# Patient Record
Sex: FEMALE | Race: WHITE | NOT HISPANIC OR LATINO | Employment: FULL TIME | URBAN - METROPOLITAN AREA
[De-identification: names, ages, dates, MRNs, and addresses within clinical notes are randomized per-mention and may not be internally consistent; named-entity substitution may affect disease eponyms.]

---

## 2017-01-23 RX ORDER — LOSARTAN POTASSIUM 100 MG/1
TABLET ORAL
Refills: 2 | OUTPATIENT
Start: 2017-01-23

## 2017-01-27 ENCOUNTER — APPOINTMENT (OUTPATIENT)
Dept: MEDICAL GROUP | Facility: PHYSICIAN GROUP | Age: 62
End: 2017-01-27
Payer: COMMERCIAL

## 2017-04-18 ENCOUNTER — HOSPITAL ENCOUNTER (OUTPATIENT)
Dept: LAB | Facility: MEDICAL CENTER | Age: 62
End: 2017-04-18
Attending: INTERNAL MEDICINE
Payer: COMMERCIAL

## 2017-04-18 DIAGNOSIS — Z00.00 ROUTINE GENERAL MEDICAL EXAMINATION AT A HEALTH CARE FACILITY: ICD-10-CM

## 2017-04-18 LAB
ALBUMIN SERPL BCP-MCNC: 4.2 G/DL (ref 3.2–4.9)
ALBUMIN/GLOB SERPL: 1.5 G/DL
ALP SERPL-CCNC: 47 U/L (ref 30–99)
ALT SERPL-CCNC: 15 U/L (ref 2–50)
ANION GAP SERPL CALC-SCNC: 7 MMOL/L (ref 0–11.9)
AST SERPL-CCNC: 16 U/L (ref 12–45)
BASOPHILS # BLD AUTO: 1.1 % (ref 0–1.8)
BASOPHILS # BLD: 0.05 K/UL (ref 0–0.12)
BILIRUB SERPL-MCNC: 0.6 MG/DL (ref 0.1–1.5)
BUN SERPL-MCNC: 22 MG/DL (ref 8–22)
CALCIUM SERPL-MCNC: 9.2 MG/DL (ref 8.5–10.5)
CHLORIDE SERPL-SCNC: 105 MMOL/L (ref 96–112)
CHOLEST SERPL-MCNC: 203 MG/DL (ref 100–199)
CO2 SERPL-SCNC: 26 MMOL/L (ref 20–33)
CREAT SERPL-MCNC: 0.69 MG/DL (ref 0.5–1.4)
EOSINOPHIL # BLD AUTO: 0.09 K/UL (ref 0–0.51)
EOSINOPHIL NFR BLD: 1.9 % (ref 0–6.9)
ERYTHROCYTE [DISTWIDTH] IN BLOOD BY AUTOMATED COUNT: 44.3 FL (ref 35.9–50)
GFR SERPL CREATININE-BSD FRML MDRD: >60 ML/MIN/1.73 M 2
GLOBULIN SER CALC-MCNC: 2.8 G/DL (ref 1.9–3.5)
GLUCOSE SERPL-MCNC: 113 MG/DL (ref 65–99)
HCT VFR BLD AUTO: 39.6 % (ref 37–47)
HDLC SERPL-MCNC: 130 MG/DL
HGB BLD-MCNC: 12.7 G/DL (ref 12–16)
IMM GRANULOCYTES # BLD AUTO: 0 K/UL (ref 0–0.11)
IMM GRANULOCYTES NFR BLD AUTO: 0 % (ref 0–0.9)
LDLC SERPL CALC-MCNC: 61 MG/DL
LYMPHOCYTES # BLD AUTO: 1.54 K/UL (ref 1–4.8)
LYMPHOCYTES NFR BLD: 32.8 % (ref 22–41)
MCH RBC QN AUTO: 28.9 PG (ref 27–33)
MCHC RBC AUTO-ENTMCNC: 32.1 G/DL (ref 33.6–35)
MCV RBC AUTO: 90 FL (ref 81.4–97.8)
MONOCYTES # BLD AUTO: 0.34 K/UL (ref 0–0.85)
MONOCYTES NFR BLD AUTO: 7.2 % (ref 0–13.4)
NEUTROPHILS # BLD AUTO: 2.68 K/UL (ref 2–7.15)
NEUTROPHILS NFR BLD: 57 % (ref 44–72)
NRBC # BLD AUTO: 0 K/UL
NRBC BLD AUTO-RTO: 0 /100 WBC
PLATELET # BLD AUTO: 234 K/UL (ref 164–446)
PMV BLD AUTO: 11.1 FL (ref 9–12.9)
POTASSIUM SERPL-SCNC: 4.2 MMOL/L (ref 3.6–5.5)
PROT SERPL-MCNC: 7 G/DL (ref 6–8.2)
RBC # BLD AUTO: 4.4 M/UL (ref 4.2–5.4)
SODIUM SERPL-SCNC: 138 MMOL/L (ref 135–145)
TRIGL SERPL-MCNC: 59 MG/DL (ref 0–149)
WBC # BLD AUTO: 4.7 K/UL (ref 4.8–10.8)

## 2017-04-18 PROCEDURE — 36415 COLL VENOUS BLD VENIPUNCTURE: CPT

## 2017-04-18 PROCEDURE — 80061 LIPID PANEL: CPT

## 2017-04-18 PROCEDURE — 80053 COMPREHEN METABOLIC PANEL: CPT

## 2017-04-18 PROCEDURE — 85025 COMPLETE CBC W/AUTO DIFF WBC: CPT

## 2017-04-19 NOTE — PROGRESS NOTES
Quick Note:    labs look good. We can discuss further at your next appointment.   [place],  Christian Barnett M.D.    ______

## 2017-04-27 ENCOUNTER — OFFICE VISIT (OUTPATIENT)
Dept: MEDICAL GROUP | Facility: PHYSICIAN GROUP | Age: 62
End: 2017-04-27
Payer: COMMERCIAL

## 2017-04-27 VITALS
HEART RATE: 68 BPM | DIASTOLIC BLOOD PRESSURE: 90 MMHG | SYSTOLIC BLOOD PRESSURE: 170 MMHG | HEIGHT: 62 IN | WEIGHT: 117.5 LBS | TEMPERATURE: 97.8 F | BODY MASS INDEX: 21.62 KG/M2 | OXYGEN SATURATION: 98 %

## 2017-04-27 DIAGNOSIS — R73.01 ELEVATED FASTING GLUCOSE: ICD-10-CM

## 2017-04-27 DIAGNOSIS — R01.1 CARDIAC MURMUR: ICD-10-CM

## 2017-04-27 DIAGNOSIS — F41.9 ANXIETY: ICD-10-CM

## 2017-04-27 DIAGNOSIS — D70.9 NEUTROPENIA, UNSPECIFIED TYPE (HCC): ICD-10-CM

## 2017-04-27 DIAGNOSIS — I10 ESSENTIAL HYPERTENSION: ICD-10-CM

## 2017-04-27 PROBLEM — D72.819 LEUKOPENIA: Status: ACTIVE | Noted: 2017-04-27

## 2017-04-27 PROCEDURE — 99214 OFFICE O/P EST MOD 30 MIN: CPT | Performed by: INTERNAL MEDICINE

## 2017-04-27 RX ORDER — AMLODIPINE BESYLATE 10 MG/1
10 TABLET ORAL DAILY
Qty: 30 TAB | Refills: 3 | Status: SHIPPED | OUTPATIENT
Start: 2017-04-27 | End: 2017-05-24

## 2017-04-27 ASSESSMENT — PATIENT HEALTH QUESTIONNAIRE - PHQ9: CLINICAL INTERPRETATION OF PHQ2 SCORE: 0

## 2017-04-27 NOTE — MR AVS SNAPSHOT
"        Praveena Landers   2017 10:00 AM   Office Visit   MRN: [id number]    Department:  [facility]   Dept Phone:  [phone]    Description:  Female : 1955   Provider:  Christian Barnett M.D.           Reason for Visit     Follow-Up labs and HTN      Allergies as of 2017     Allergen Noted Reactions    Benazepril 2015       Cough    Penicillins 10/03/2014         You were diagnosed with     Essential hypertension   [5384073]       Elevated fasting glucose   [466580]       Neutropenia, unspecified type (CMS-HCC)   [3741635]       Cardiac murmur   [[id number]]         Vital Signs     Blood Pressure Pulse Temperature Height Weight Body Mass Index    170/90 mmHg 68 36.6 °C (97.8 °F) 1.575 m (5' 2.01\") 53.3 kg (117 lb 8.1 oz) 21.49 kg/m2    Oxygen Saturation Smoking Status                98% Never Smoker           Basic Information     Date Of Birth Sex Race Ethnicity Preferred Language    1955 Female White Non- English      Problem List              ICD-10-CM Priority Class Noted - Resolved    Hypertension I10   2012 - Present    Cardiac murmur R01.1   10/22/2013 - Present    GERD (gastroesophageal reflux disease) K21.9   10/3/2014 - Present    Herpes simplex B00.9   10/3/2014 - Present    Routine general medical examination at a health care facility Z00.00   10/25/2016 - Present    Elevated fasting glucose R73.01   2017 - Present    Leukopenia D72.819   2017 - Present      Health Maintenance        Date Due Completion Dates    IMM DTaP/Tdap/Td Vaccine (1 - Tdap) 3/31/1975 ---    IMM ZOSTER VACCINE 3/31/2016 ---    MAMMOGRAM 2018 (Done), 2/3/2014 (N/S), 2009, 2009, 2009, 2007, 2007, 3/3/2005, 2004    Override on 2017: Done    Override on 2/3/2014: (N/S)    COLONOSCOPY 2020            Current Immunizations     Influenza Vaccine Quad Inj (Pf) 10/25/2016 10:20 AM    Influenza Vaccine Quad Inj (Preserved) " 11/2/2016      Below and/or attached are the medications your provider expects you to take. Review all of your home medications and newly ordered medications with your provider and/or pharmacist. Follow medication instructions as directed by your provider and/or pharmacist. Please keep your medication list with you and share with your provider. Update the information when medications are discontinued, doses are changed, or new medications (including over-the-counter products) are added; and carry medication information at all times in the event of emergency situations     Allergies:  BENAZEPRIL - (reactions not documented)     PENICILLINS - (reactions not documented)               Medications  Valid as of: April 27, 2017 - 10:34 AM    Generic Name Brand Name Tablet Size Instructions for use    Acyclovir (Cap) ZOVIRAX 200 MG Take 2 Caps by mouth 3 times a day as needed.        AmLODIPine Besylate (Tab) NORVASC 10 MG Take 1 Tab by mouth every day.        Biotin   Take  by mouth.        Cholecalciferol (Cap) Vitamin D 2000 UNITS Take 1 Cap by mouth.        Coenzyme Q10 (Cap) coenzyme Q-10 30 MG Take 60 mg by mouth every day.        Estradiol (Tab) ESTRACE 1 MG Take 1 mg by mouth every day.        Folic Acid   Take  by mouth.        Losartan Potassium (Tab) COZAAR 100 MG Take 1 Tab by mouth every day.        Multiple Vitamins-Minerals   Take  by mouth.        Probiotic Product   Take  by mouth.        .                 Medicines prescribed today were sent to:     [facility]ARMACY #[id number] - [place] - [address]    [address] [place] [address]    Phone: [phone] Fax: [phone]    Open 24 Hours?: No      Medication refill instructions:       If your prescription bottle indicates you have medication refills left, it is not necessary to call your provider’s office. Please contact your pharmacy and they will refill your medication.    If your prescription bottle indicates you do not have any refills left, you  may request refills at any time through one of the following ways: The online GoodyTag system (except Urgent Care), by calling your provider’s office, or by asking your pharmacy to contact your provider’s office with a refill request. Medication refills are processed only during regular business hours and may not be available until the next business day. Your provider may request additional information or to have a follow-up visit with you prior to refilling your medication.   *Please Note: Medication refills are assigned a new Rx number when refilled electronically. Your pharmacy may indicate that no refills were authorized even though a new prescription for the same medication is available at the pharmacy. Please request the medicine by name with the pharmacy before contacting your provider for a refill.           GoodyTag Access Code: Activation code not generated  Current GoodyTag Status: Active

## 2017-04-27 NOTE — ASSESSMENT & PLAN NOTE
Note from recent lab work slight leukopenia. WBC 4.7. Patient is contributing to recent antibiotic use for tooth extraction. She denies lymphadenopathy, fever, night sweats.

## 2017-04-27 NOTE — PROGRESS NOTES
Subjective:   Praveena Landers is a 62 y.o. female here today for       Hypertension  Anxiety   Patient presented today to follow-up for hypertension.BP today 170/90. She tells me that BP at home 130/80. Patient reports some headaches prior to coming. She denies any chest pain, palpitations, leg swelling. She is taking losartan 100 mg daily. Never had this high blood pressure before. She is contributing the blood pressure elevation due to anxiety at work. 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 She is worried about work, possible fund cutting, or closing the program. She avoids salt. She exercise and she is very active. She sleeps well.     Cardiac murmur  Echocardiogram 2009  Slight thickening of one of the mitral valve   Not symptomatic    Elevated fasting glucose  Glucose 113. She blames eating too much chocolate during easter    Leukopenia  Note from recent lab work slight leukopenia. WBC 4.7. Patient is contributing to recent antibiotic use for tooth extraction. She denies lymphadenopathy, fever, night sweats.       Current medicines (including changes today)  Current Outpatient Prescriptions   Medication Sig Dispense Refill   • Probiotic Product (PROBIOTIC DAILY PO) Take  by mouth.     • amlodipine (NORVASC) 10 MG Tab Take 1 Tab by mouth every day. 30 Tab 3   • acyclovir (ZOVIRAX) 200 MG Cap Take 2 Caps by mouth 3 times a day as needed. 60 Cap 2   • losartan (COZAAR) 100 MG Tab Take 1 Tab by mouth every day. 90 Tab 3   • Multiple Vitamins-Minerals (MULTIPLE VITAMINS/WOMENS PO) Take  by mouth.     • FOLIC ACID PO Take  by mouth.     • BIOTIN PO Take  by mouth.     • coenzyme Q-10 30 MG capsule Take 60 mg by mouth every day.     • Cholecalciferol (VITAMIN D) 2000 UNITS CAPS Take 1 Cap by mouth.     • estradiol (ESTRACE) 1 MG TABS Take 1 mg by mouth every day.       No current facility-administered medications for this visit.     She  has a past medical history of Herpes simplex; GERD  (gastroesophageal reflux disease) (10/3/2014); and Hypertension.    Current Outpatient Prescriptions   Medication Sig Dispense Refill   • Probiotic Product (PROBIOTIC DAILY PO) Take  by mouth.     • amlodipine (NORVASC) 10 MG Tab Take 1 Tab by mouth every day. 30 Tab 3   • acyclovir (ZOVIRAX) 200 MG Cap Take 2 Caps by mouth 3 times a day as needed. 60 Cap 2   • losartan (COZAAR) 100 MG Tab Take 1 Tab by mouth every day. 90 Tab 3   • Multiple Vitamins-Minerals (MULTIPLE VITAMINS/WOMENS PO) Take  by mouth.     • FOLIC ACID PO Take  by mouth.     • BIOTIN PO Take  by mouth.     • coenzyme Q-10 30 MG capsule Take 60 mg by mouth every day.     • Cholecalciferol (VITAMIN D) 2000 UNITS CAPS Take 1 Cap by mouth.     • estradiol (ESTRACE) 1 MG TABS Take 1 mg by mouth every day.       No current facility-administered medications for this visit.       Allergies as of 04/27/2017 - [facility] as Reviewed 04/27/2017   Allergen Reaction Noted   • Benazepril  12/28/2015   • Penicillins  10/03/2014       Social History     Social History   • Marital Status: Single     Spouse Name: N/A   • Number of Children: N/A   • Years of Education: N/A     Occupational History   • Not on file.     Social History Main Topics   • Smoking status: Never Smoker    • Smokeless tobacco: Never Used   • Alcohol Use: Yes      Comment: 1gl wine daily   • Drug Use: No   • Sexual Activity:     Partners: Male     Other Topics Concern   • Not on file     Social History Narrative        Family History   Problem Relation Age of Onset   • Cancer Mother      breast   • Cancer Father      lung   • Hypertension Father    • Diabetes Paternal Uncle    • Heart Disease Neg Hx    • Stroke Neg Hx    • Cancer Sister 62     colon cancer       Past Surgical History   Procedure Laterality Date   • Abdominal hysterectomy total  2001     partial, precancer cervix   • Wrist exploration       Right   • Lumpectomy  age 19     R, benign       ROS   No chest pain, no shortness of breath,  "no abdominal pain, see HPI        Objective:     Blood pressure 170/90, pulse 68, temperature 36.6 °C (97.8 °F), height 1.575 m (5' 2.01\"), weight 53.3 kg (117 lb 8.1 oz), SpO2 98 %. Body mass index is 21.49 kg/(m^2).   Physical Exam:  Constitutional: Alert, no distress.  Skin: Warm, dry, good turgor, no rashes in visible areas.  Eye: Equal, round and reactive, conjunctiva clear, lids normal.  ENMT: Lips without lesions, good dentition, oropharynx clear.  Neck: Trachea midline, no masses, no thyromegaly. No cervical or supraclavicular lymphadenopathy  Respiratory: Unlabored respiratory effort, lungs clear to auscultation, no wheezes, no ronchi.  Cardiovascular: Normal S1, S2, very faint clicking murmur, no edema.  Abdomen: Soft, non-tender, no masses, no hepatosplenomegaly.  Psych: Alert and oriented x3, normal affect and mood.        Assessment and Plan:   The following treatment plan was discussed    1. Essential hypertension  2. Anxiety   Not well controled. I suspect BP is higher at home. She is contributing elevated BP due to anxiety. She is very healthy otherwise. , not able to calculate ASCVD score   Counseling provided for anxiety. Explained that best medicine for anxiety is cognitive therapy. She is not interested on medications. Anxiety not interfering with daily activities     2. Elevated fasting glucose  No signs of diabetes. Will check A1c on next apt    3. Neutropenia, unspecified type (CMS-HCC)  Possible false error. Will continue to monitor     4. Cardiac murmur  Asymptomatic. Continue to monitor       Followup: Return in about 4 weeks (around 5/25/2017) for Short, follow up on HTN.           "

## 2017-04-27 NOTE — ASSESSMENT & PLAN NOTE
Patient presented today to follow-up for hypertension.BP today 170/90. She tells me that BP at home 130/80. Patient reports some headaches prior to coming. She denies any chest pain, palpitations, leg swelling. She is taking losartan 100 mg daily. Never had this high blood pressure before.

## 2017-05-11 ENCOUNTER — TELEPHONE (OUTPATIENT)
Dept: MEDICAL GROUP | Facility: PHYSICIAN GROUP | Age: 62
End: 2017-05-11

## 2017-05-11 NOTE — TELEPHONE ENCOUNTER
----- Message from Your Healthcare Team sent at 5/11/2017  7:56 AM PDT -----  Regarding: Non-Urgent Medical Question  Contact: [phone]  Greetings Dr. Barnett,  I have been tracking my blood pressure twice daily since you put me on Norvasc and it has been consistently great.  I take the Norvasc at night and the Losartan in the morning.  Readings have ranged between 126/76 (highest) to 110/65 (lowest).  I check my blood pressure at 6:00am and 6:00pm.  I would like to cancel my June 6th appointment with you and continue on the Norvasc!  I have no side affects and feel great! Thank you!  Praveena Mitchell

## 2017-05-11 NOTE — TELEPHONE ENCOUNTER
1. Caller Name: Praveena Lanedrs                      Call Back Number: [phone] (home) [phone] (work)    2. Message: Pt emailed to notify Dr. Barnett of BP since starting medication. To Dr. Barnett to advise.    3. Patient approves office to leave a detailed voicemail/MyChart message: N\A

## 2017-05-24 ENCOUNTER — OFFICE VISIT (OUTPATIENT)
Dept: MEDICAL GROUP | Facility: PHYSICIAN GROUP | Age: 62
End: 2017-05-24
Payer: COMMERCIAL

## 2017-05-24 VITALS
OXYGEN SATURATION: 96 % | BODY MASS INDEX: 21.35 KG/M2 | SYSTOLIC BLOOD PRESSURE: 148 MMHG | RESPIRATION RATE: 16 BRPM | HEIGHT: 62 IN | TEMPERATURE: 99.3 F | HEART RATE: 64 BPM | DIASTOLIC BLOOD PRESSURE: 86 MMHG | WEIGHT: 116 LBS

## 2017-05-24 DIAGNOSIS — D70.9 NEUTROPENIA, UNSPECIFIED TYPE (HCC): ICD-10-CM

## 2017-05-24 DIAGNOSIS — I10 ESSENTIAL HYPERTENSION: ICD-10-CM

## 2017-05-24 PROCEDURE — 99213 OFFICE O/P EST LOW 20 MIN: CPT | Performed by: INTERNAL MEDICINE

## 2017-05-24 RX ORDER — AMLODIPINE BESYLATE 5 MG/1
5 TABLET ORAL DAILY
Qty: 30 TAB | Refills: 0 | COMMUNITY
Start: 2017-05-24 | End: 2019-03-21

## 2017-05-24 NOTE — MR AVS SNAPSHOT
"        Praveena Landers   2017 10:00 AM   Office Visit   MRN: [id number]    Department:  [facility]   Dept Phone:  [phone]    Description:  Female : 1955   Provider:  Christian Barnett M.D.           Reason for Visit     Medication Reaction amlodipine- swelling, pain, Hips down      Allergies as of 2017     Allergen Noted Reactions    Amlodipine 2017   Swelling    Benazepril 2015       Cough    Penicillins 10/03/2014         You were diagnosed with     Essential hypertension   [0331694]       Neutropenia, unspecified type (CMS-[facility])   [[id number]]         Vital Signs     Blood Pressure Pulse Temperature Respirations Height Weight    148/86 mmHg 64 37.4 °C (99.3 °F) 16 1.575 m (5' 2\") 52.617 kg (116 lb)    Body Mass Index Oxygen Saturation Breastfeeding? Smoking Status          21.21 kg/m2 96% No Never Smoker         Basic Information     Date Of Birth Sex Race Ethnicity Preferred Language    1955 Female White Non- English      Your appointments     2017 10:00 AM   Established Patient with Christian Barnett M.D.   [facility] (--)    [address]  [address]  [facility] [address] [phone]           You will be receiving a confirmation call a few days before your appointment from our automated call confirmation system.              Problem List              ICD-10-CM Priority Class Noted - Resolved    Hypertension I10   2012 - Present    Cardiac murmur R01.1   10/22/2013 - Present    GERD (gastroesophageal reflux disease) K21.9   10/3/2014 - Present    Herpes simplex B00.9   10/3/2014 - Present    Routine general medical examination at a health care facility Z00.00   10/25/2016 - Present    Elevated fasting glucose R73.01   2017 - Present    Leukopenia D72.819   2017 - Present    Anxiety F41.9   2017 - Present      Health Maintenance        Date Due Completion Dates    IMM DTaP/Tdap/Td Vaccine (1 - Tdap) 3/31/1975 ---    IMM ZOSTER " VACCINE 3/31/2016 ---    MAMMOGRAM 2/17/2018 2/17/2017 (Done), 2/3/2014 (N/S), 6/18/2009, 5/26/2009, 5/26/2009, 7/19/2007, 7/19/2007, 3/3/2005, 2/24/2004    Override on 2/17/2017: Done    Override on 2/3/2014: (N/S)    COLONOSCOPY 5/26/2020 5/26/2015            Current Immunizations     Influenza Vaccine Quad Inj (Pf) 10/25/2016 10:20 AM    Influenza Vaccine Quad Inj (Preserved) 11/2/2016      Below and/or attached are the medications your provider expects you to take. Review all of your home medications and newly ordered medications with your provider and/or pharmacist. Follow medication instructions as directed by your provider and/or pharmacist. Please keep your medication list with you and share with your provider. Update the information when medications are discontinued, doses are changed, or new medications (including over-the-counter products) are added; and carry medication information at all times in the event of emergency situations     Allergies:  AMLODIPINE - Swelling     BENAZEPRIL - (reactions not documented)     PENICILLINS - (reactions not documented)               Medications  Valid as of: May 24, 2017 - 10:52 AM    Generic Name Brand Name Tablet Size Instructions for use    Acyclovir (Cap) ZOVIRAX 200 MG Take 2 Caps by mouth 3 times a day as needed.        AmLODIPine Besylate (Tab) NORVASC 5 MG Take 1 Tab by mouth every day.        Biotin   Take  by mouth.        Cholecalciferol (Cap) Vitamin D 2000 UNITS Take 1 Cap by mouth.        Coenzyme Q10 (Cap) coenzyme Q-10 30 MG Take 60 mg by mouth every day.        Estradiol (Tab) ESTRACE 1 MG Take 1 mg by mouth every day.        Folic Acid   Take  by mouth.        Losartan Potassium (Tab) COZAAR 100 MG Take 1 Tab by mouth every day.        Multiple Vitamins-Minerals   Take  by mouth.        Probiotic Product   Take  by mouth.        .                 Medicines prescribed today were sent to:     [facility]/PHARMACY #[id number] - [place], [place] - [address]    [address] [place] [address]    Phone: [phone] Fax: [phone]    Open 24 Hours?: No      Medication refill instructions:       If your prescription bottle indicates you have medication refills left, it is not necessary to call your provider’s office. Please contact your pharmacy and they will refill your medication.    If your prescription bottle indicates you do not have any refills left, you may request refills at any time through one of the following ways: The online CodeSealer system (except Urgent Care), by calling your provider’s office, or by asking your pharmacy to contact your provider’s office with a refill request. Medication refills are processed only during regular business hours and may not be available until the next business day. Your provider may request additional information or to have a follow-up visit with you prior to refilling your medication.   *Please Note: Medication refills are assigned a new Rx number when refilled electronically. Your pharmacy may indicate that no refills were authorized even though a new prescription for the same medication is available at the pharmacy. Please request the medicine by name with the pharmacy before contacting your provider for a refill.        Your To Do List     Future Labs/Procedures Complete By Expires    BASIC METABOLIC PANEL  As directed 5/25/2018    CBC WITH DIFFERENTIAL  As directed 5/25/2018         Turbul[employer] Access Code: Activation code not generated  Current CodeSealer Status: Active

## 2017-05-25 NOTE — PROGRESS NOTES
Subjective:   Praveena Landers is a 62 y.o. female here today for hypertension    Hypertension  Last appointment and blood pressure was  elevated 170/90. I started amlodipine 10 mg daily. She was tolerating the medication very well at the beginning. last week she started noticing fullness of her legs. Yesterday she felt lightheaded, sick and her legs were significantly tender and swollen up. She checked her blood pressure at home and was 80/60. She stopped taking her amlodipine and swelling of her leg have been significantly reduced.  She tells the blood pressure was well controlled initially. Blood pressure today on 348/86. She denies today chest pain, headache, blurred vision, palpitations, leg swelling    Leukopenia  TSH normal. She was contributing due to recent antibiotic use for tooth extraction. We'll reevaluate        Current medicines (including changes today)  Current Outpatient Prescriptions   Medication Sig Dispense Refill   • amlodipine (NORVASC) 5 MG Tab Take 1 Tab by mouth every day. 30 Tab 0   • Probiotic Product (PROBIOTIC DAILY PO) Take  by mouth.     • losartan (COZAAR) 100 MG Tab Take 1 Tab by mouth every day. 90 Tab 3   • Multiple Vitamins-Minerals (MULTIPLE VITAMINS/WOMENS PO) Take  by mouth.     • FOLIC ACID PO Take  by mouth.     • BIOTIN PO Take  by mouth.     • coenzyme Q-10 30 MG capsule Take 60 mg by mouth every day.     • Cholecalciferol (VITAMIN D) 2000 UNITS CAPS Take 1 Cap by mouth.     • estradiol (ESTRACE) 1 MG TABS Take 1 mg by mouth every day.     • acyclovir (ZOVIRAX) 200 MG Cap Take 2 Caps by mouth 3 times a day as needed. 60 Cap 2     No current facility-administered medications for this visit.     She  has a past medical history of Herpes simplex; GERD (gastroesophageal reflux disease) (10/3/2014); and Hypertension.    Current Outpatient Prescriptions   Medication Sig Dispense Refill   • amlodipine (NORVASC) 5 MG Tab Take 1 Tab by mouth every day. 30 Tab 0   • Probiotic  "Product (PROBIOTIC DAILY PO) Take  by mouth.     • losartan (COZAAR) 100 MG Tab Take 1 Tab by mouth every day. 90 Tab 3   • Multiple Vitamins-Minerals (MULTIPLE VITAMINS/WOMENS PO) Take  by mouth.     • FOLIC ACID PO Take  by mouth.     • BIOTIN PO Take  by mouth.     • coenzyme Q-10 30 MG capsule Take 60 mg by mouth every day.     • Cholecalciferol (VITAMIN D) 2000 UNITS CAPS Take 1 Cap by mouth.     • estradiol (ESTRACE) 1 MG TABS Take 1 mg by mouth every day.     • acyclovir (ZOVIRAX) 200 MG Cap Take 2 Caps by mouth 3 times a day as needed. 60 Cap 2     No current facility-administered medications for this visit.       Allergies as of 05/24/2017 - [facility] as Reviewed 05/24/2017   Allergen Reaction Noted   • Amlodipine Swelling 05/24/2017   • Benazepril  12/28/2015   • Penicillins  10/03/2014       Social History     Social History   • Marital Status: Single     Spouse Name: N/A   • Number of Children: N/A   • Years of Education: N/A     Occupational History   • Not on file.     Social History Main Topics   • Smoking status: Never Smoker    • Smokeless tobacco: Never Used   • Alcohol Use: Yes      Comment: 1gl wine daily   • Drug Use: No   • Sexual Activity:     Partners: Male     Other Topics Concern   • Not on file     Social History Narrative        Family History   Problem Relation Age of Onset   • Cancer Mother      breast   • Cancer Father      lung   • Hypertension Father    • Diabetes Paternal Uncle    • Heart Disease Neg Hx    • Stroke Neg Hx    • Cancer Sister 62     colon cancer       Past Surgical History   Procedure Laterality Date   • Abdominal hysterectomy total  2001     partial, precancer cervix   • Wrist exploration       Right   • Lumpectomy  age 19     R, benign       ROS   No chest pain, no shortness of breath, no abdominal pain, see hpi        Objective:     Blood pressure 148/86, pulse 64, temperature 37.4 °C (99.3 °F), resp. rate 16, height 1.575 m (5' 2\"), weight 52.617 kg (116 lb), SpO2 96 " %, not currently breastfeeding. Body mass index is 21.21 kg/(m^2).   Physical Exam:  Constitutional: Alert, no distress.  Skin: Warm, dry, good turgor, no rashes in visible areas.  Eye: Equal, round and reactive, conjunctiva clear, lids normal.  ENMT: Lips without lesions, good dentition, oropharynx clear.  Neck: Trachea midline, no masses, no thyromegaly. No cervical or supraclavicular lymphadenopathy  Respiratory: Unlabored respiratory effort, lungs clear to auscultation, no wheezes, no ronchi.  Cardiovascular: Normal S1, S2, no murmur, no edema.  Abdomen: Soft, non-tender, no masses, no hepatosplenomegaly.  Psych: Alert and oriented x3, normal affect and mood.        Assessment and Plan:   The following treatment plan was discussed    1. Essential hypertension  Im concerned that she has labile BP. She agreed on trying lower dose of amlodipine . If symptoms of leg swelling will reocur, will switch her to HCTZ. If the second one is not working will consider vascular referral . Lab work to follow. Continue to monitor   - BASIC METABOLIC PANEL; Future    2. Neutropenia, unspecified type (CMS-HCC)  Reevalute. No B symptoms   - CBC WITH DIFFERENTIAL; Future      Followup: Return in about 4 weeks (around 6/21/2017) for Short.

## 2017-05-25 NOTE — ASSESSMENT & PLAN NOTE
Last appointment and blood pressure was  elevated 170/90. I started amlodipine 10 mg daily. She was tolerating the medication very well at the beginning. last week she started noticing fullness of her legs. Yesterday she felt lightheaded, sick and her legs were significantly tender and swollen up. She checked her blood pressure at home and was 80/60. She stopped taking her amlodipine and swelling of her leg have been significantly reduced.  She tells the blood pressure was well controlled initially. Blood pressure today on 348/86. She denies today chest pain, headache, blurred vision, palpitations, leg swelling

## 2017-09-11 RX ORDER — AMLODIPINE BESYLATE 10 MG/1
TABLET ORAL
Qty: 90 TAB | Refills: 3 | Status: SHIPPED | OUTPATIENT
Start: 2017-09-11 | End: 2019-03-21

## 2017-10-02 RX ORDER — AMLODIPINE BESYLATE 10 MG/1
TABLET ORAL
Qty: 90 TAB | Refills: 0 | Status: SHIPPED | OUTPATIENT
Start: 2017-10-02 | End: 2019-03-21

## 2017-10-13 DIAGNOSIS — B00.9 HERPES SIMPLEX INFECTION OF SKIN: ICD-10-CM

## 2017-10-13 RX ORDER — ACYCLOVIR 200 MG/1
400 CAPSULE ORAL 3 TIMES DAILY PRN
Qty: 60 CAP | Refills: 5 | Status: SHIPPED | OUTPATIENT
Start: 2017-10-13 | End: 2018-11-25 | Stop reason: SDUPTHER

## 2017-10-13 NOTE — TELEPHONE ENCOUNTER
----- Message from Praveena Mitchell sent at 10/13/2017 10:57 AM PDT -----  Regarding: Prescription Question  Contact: [phone]  Greetings Dr. Barnett,    I am trying to request a refill of acyclovir and I can't seem to get the prescription request window to work! I have had a prescription but it is out of refills.  This is a medicine that I take for herpes flare-ups that happen from time-to-time and it is not on a monthly refill, but was refilled as needed.  My pharmacy is the [facility] and they sent a request but then asked me to contact you.  Thank you for your help in this matter.  Praveena Mitchell  [phone]

## 2017-11-22 DIAGNOSIS — I10 ESSENTIAL HYPERTENSION: ICD-10-CM

## 2017-11-22 RX ORDER — LOSARTAN POTASSIUM 100 MG/1
TABLET ORAL
Qty: 90 TAB | Refills: 3 | Status: SHIPPED | OUTPATIENT
Start: 2017-11-22 | End: 2018-11-30 | Stop reason: SDUPTHER

## 2018-11-25 DIAGNOSIS — B00.9 HERPES SIMPLEX INFECTION OF SKIN: ICD-10-CM

## 2018-11-26 RX ORDER — ACYCLOVIR 200 MG/1
400 CAPSULE ORAL 3 TIMES DAILY PRN
Qty: 60 CAP | Refills: 4 | Status: SHIPPED | OUTPATIENT
Start: 2018-11-26 | End: 2019-03-21 | Stop reason: SDUPTHER

## 2018-12-10 ENCOUNTER — HOSPITAL ENCOUNTER (EMERGENCY)
Facility: MEDICAL CENTER | Age: 63
End: 2018-12-10
Attending: EMERGENCY MEDICINE
Payer: COMMERCIAL

## 2018-12-10 ENCOUNTER — APPOINTMENT (OUTPATIENT)
Dept: RADIOLOGY | Facility: MEDICAL CENTER | Age: 63
End: 2018-12-10
Attending: EMERGENCY MEDICINE
Payer: COMMERCIAL

## 2018-12-10 VITALS
RESPIRATION RATE: 16 BRPM | OXYGEN SATURATION: 96 % | BODY MASS INDEX: 21.5 KG/M2 | SYSTOLIC BLOOD PRESSURE: 159 MMHG | TEMPERATURE: 98.9 F | WEIGHT: 116.84 LBS | DIASTOLIC BLOOD PRESSURE: 96 MMHG | HEART RATE: 70 BPM | HEIGHT: 62 IN

## 2018-12-10 DIAGNOSIS — R10.30 LOWER ABDOMINAL PAIN: ICD-10-CM

## 2018-12-10 LAB
ANION GAP SERPL CALC-SCNC: 11 MMOL/L (ref 0–11.9)
APPEARANCE UR: CLEAR
BASOPHILS # BLD AUTO: 0.4 % (ref 0–1.8)
BASOPHILS # BLD: 0.03 K/UL (ref 0–0.12)
BILIRUB UR QL STRIP.AUTO: NEGATIVE
BUN SERPL-MCNC: 9 MG/DL (ref 8–22)
CALCIUM SERPL-MCNC: 8.7 MG/DL (ref 8.4–10.2)
CHLORIDE SERPL-SCNC: 99 MMOL/L (ref 96–112)
CO2 SERPL-SCNC: 24 MMOL/L (ref 20–33)
COLOR UR: YELLOW
CREAT SERPL-MCNC: 0.63 MG/DL (ref 0.5–1.4)
EOSINOPHIL # BLD AUTO: 0.05 K/UL (ref 0–0.51)
EOSINOPHIL NFR BLD: 0.6 % (ref 0–6.9)
ERYTHROCYTE [DISTWIDTH] IN BLOOD BY AUTOMATED COUNT: 40.7 FL (ref 35.9–50)
GLUCOSE SERPL-MCNC: 96 MG/DL (ref 65–99)
GLUCOSE UR STRIP.AUTO-MCNC: NEGATIVE MG/DL
HCT VFR BLD AUTO: 37.3 % (ref 37–47)
HGB BLD-MCNC: 12.3 G/DL (ref 12–16)
IMM GRANULOCYTES # BLD AUTO: 0.02 K/UL (ref 0–0.11)
IMM GRANULOCYTES NFR BLD AUTO: 0.2 % (ref 0–0.9)
KETONES UR STRIP.AUTO-MCNC: 40 MG/DL
LEUKOCYTE ESTERASE UR QL STRIP.AUTO: NEGATIVE
LYMPHOCYTES # BLD AUTO: 1.09 K/UL (ref 1–4.8)
LYMPHOCYTES NFR BLD: 13.2 % (ref 22–41)
MCH RBC QN AUTO: 28.9 PG (ref 27–33)
MCHC RBC AUTO-ENTMCNC: 33 G/DL (ref 33.6–35)
MCV RBC AUTO: 87.6 FL (ref 81.4–97.8)
MICRO URNS: ABNORMAL
MONOCYTES # BLD AUTO: 0.71 K/UL (ref 0–0.85)
MONOCYTES NFR BLD AUTO: 8.6 % (ref 0–13.4)
NEUTROPHILS # BLD AUTO: 6.38 K/UL (ref 2–7.15)
NEUTROPHILS NFR BLD: 77 % (ref 44–72)
NITRITE UR QL STRIP.AUTO: NEGATIVE
NRBC # BLD AUTO: 0 K/UL
NRBC BLD-RTO: 0 /100 WBC
PH UR STRIP.AUTO: 6 [PH]
PLATELET # BLD AUTO: 254 K/UL (ref 164–446)
PMV BLD AUTO: 9.6 FL (ref 9–12.9)
POTASSIUM SERPL-SCNC: 3.5 MMOL/L (ref 3.6–5.5)
PROT UR QL STRIP: NEGATIVE MG/DL
RBC # BLD AUTO: 4.26 M/UL (ref 4.2–5.4)
RBC UR QL AUTO: NEGATIVE
SODIUM SERPL-SCNC: 134 MMOL/L (ref 135–145)
SP GR UR STRIP.AUTO: 1.01
WBC # BLD AUTO: 8.3 K/UL (ref 4.8–10.8)

## 2018-12-10 PROCEDURE — 700111 HCHG RX REV CODE 636 W/ 250 OVERRIDE (IP): Performed by: EMERGENCY MEDICINE

## 2018-12-10 PROCEDURE — A9270 NON-COVERED ITEM OR SERVICE: HCPCS | Performed by: EMERGENCY MEDICINE

## 2018-12-10 PROCEDURE — 700102 HCHG RX REV CODE 250 W/ 637 OVERRIDE(OP): Performed by: EMERGENCY MEDICINE

## 2018-12-10 PROCEDURE — 81003 URINALYSIS AUTO W/O SCOPE: CPT

## 2018-12-10 PROCEDURE — 36415 COLL VENOUS BLD VENIPUNCTURE: CPT

## 2018-12-10 PROCEDURE — 80048 BASIC METABOLIC PNL TOTAL CA: CPT

## 2018-12-10 PROCEDURE — 99285 EMERGENCY DEPT VISIT HI MDM: CPT

## 2018-12-10 PROCEDURE — 74019 RADEX ABDOMEN 2 VIEWS: CPT

## 2018-12-10 PROCEDURE — 96374 THER/PROPH/DIAG INJ IV PUSH: CPT

## 2018-12-10 PROCEDURE — 85025 COMPLETE CBC W/AUTO DIFF WBC: CPT

## 2018-12-10 RX ORDER — POLYETHYLENE GLYCOL 3350 17 G/17G
17 POWDER, FOR SOLUTION ORAL DAILY
Qty: 3 EACH | Refills: 0 | Status: SHIPPED | OUTPATIENT
Start: 2018-12-10 | End: 2018-12-13

## 2018-12-10 RX ORDER — DICYCLOMINE HCL 20 MG
20 TABLET ORAL ONCE
Status: COMPLETED | OUTPATIENT
Start: 2018-12-10 | End: 2018-12-10

## 2018-12-10 RX ORDER — KETOROLAC TROMETHAMINE 30 MG/ML
15 INJECTION, SOLUTION INTRAMUSCULAR; INTRAVENOUS ONCE
Status: COMPLETED | OUTPATIENT
Start: 2018-12-10 | End: 2018-12-10

## 2018-12-10 RX ORDER — DICYCLOMINE HYDROCHLORIDE 10 MG/1
10 CAPSULE ORAL
Qty: 30 CAP | Refills: 0 | Status: SHIPPED | OUTPATIENT
Start: 2018-12-10 | End: 2019-09-23

## 2018-12-10 RX ADMIN — DICYCLOMINE HYDROCHLORIDE 20 MG: 20 TABLET ORAL at 12:00

## 2018-12-10 RX ADMIN — KETOROLAC TROMETHAMINE 15 MG: 30 INJECTION, SOLUTION INTRAMUSCULAR at 12:00

## 2018-12-10 ASSESSMENT — PAIN SCALES - GENERAL: PAINLEVEL_OUTOF10: 6

## 2018-12-10 NOTE — DISCHARGE INSTRUCTIONS
Your lab tests were very reassuring.  They do not suggest infection.  Your abdominal x-ray does not show any sign of obstruction.  There is an increased amount of stool and gas.  Your symptoms may be from constipation, and we are treating you for that, but we cannot say for sure that this is not an early infection.  So, it is extremely important that if you are getting worse instead of improving, especially with severe pain, fevers and chills, or pain that localizes to a specific area of your abdomen that you return to the emergency department.

## 2018-12-10 NOTE — ED NOTES
Discharge instructions provided.  Rx x3 sent to pharmacy and pt educated on how and when to take medications, Pt verbalized the understanding of discharge instructions to follow up with PCP and to return to ER if condition worsens.  Pt ambulated out of ER without difficulty.

## 2018-12-10 NOTE — ED PROVIDER NOTES
"ED Provider Note    Scribed for Luigi Calderon M.D. by Luigi Calderon. 12/10/2018,  11:39 AM.    CHIEF COMPLAINT  Chief Complaint   Patient presents with   • Abdominal Pain     since Wed  Patient worried about SBO No past hx of same   • Constipation     and diarrhea       hospitals  Praveena Landers is a 63 y.o. female who presents to the Emergency Department for bilateral lower quadrant abdominal pain since Wednesday.  She reports a sensation of constipation as well.  The pain seems to come and go with no obvious exacerbating relieving factors.  In general, it has been traveling gradually downward over the course of time.  She is accompanied by her  who recently got over an episode of \"Gabi virus.\"  The patient herself denies fevers or chills, vomiting, dysuria.  She has a history of hysterectomy, but no other abdominal surgeries.  She has never had a bowel obstruction.  She has a history of GERD, but has not had any upper abdominal pain.  She appears calm and comfortable at the bedside.  She has unremarkable vital signs.  She has a reported history of diverticulosis, confirmed on a colonoscopy 4 years ago, which she she said showed no other concerns.  She has never had an episode of acute diverticulitis.    REVIEW OF SYSTEMS  See HPI for further details. All other systems are negative.     PAST MEDICAL HISTORY   has a past medical history of GERD (gastroesophageal reflux disease) (10/3/2014); Herpes simplex; and Hypertension.    SOCIAL HISTORY  Social History     Social History Main Topics   • Smoking status: Never Smoker   • Smokeless tobacco: Never Used   • Alcohol use Yes      Comment: 1gl wine daily   • Drug use: No   • Sexual activity: Yes     Partners: Male     History   Drug Use No       SURGICAL HISTORY   has a past surgical history that includes abdominal hysterectomy total (2001); wrist exploration; and lumpectomy (age 19).    CURRENT MEDICATIONS  Home Medications    **Home medications have " "not yet been reviewed for this encounter**         ALLERGIES  Allergies   Allergen Reactions   • Amlodipine Swelling   • Benazepril      Cough   • Penicillins        PHYSICAL EXAM  VITAL SIGNS: /96   Pulse 70   Temp 37.2 °C (98.9 °F) (Temporal)   Resp 16   Ht 1.575 m (5' 2\")   Wt 53 kg (116 lb 13.5 oz)   SpO2 96%   BMI 21.37 kg/m²   Pulse ox interpretation: I interpret this pulse ox as normal.  Constitutional: Alert in no apparent distress.  HENT: No signs of trauma, Bilateral external ears normal, Nose normal.   Eyes: Conjunctiva normal, Non-icteric.   Neck: Normal range of motion, Supple, No stridor.   Lymphatic: No lymphadenopathy noted.   Cardiovascular: Regular rate and rhythm, no murmurs.   Thorax & Lungs: Normal breath sounds, No respiratory distress, No wheezing, No chest tenderness.   Abdomen: Bowel sounds normal, Soft, very mild bilateral lower quadrant tenderness, No masses, No pulsatile masses. No peritoneal signs.  Skin: Warm, Dry, No erythema, No rash.   Back: No midline bony tenderness.  Extremities: Intact distal pulses, No edema, No cyanosis.  Musculoskeletal: Good range of motion in all major joints. No or major deformities noted.   Neurologic: Alert , Normal motor function, Normal sensory function, No focal deficits noted.   Psychiatric: Affect normal, Judgment normal, Mood normal.     DIAGNOSTIC STUDIES / PROCEDURES      LABS  Labs Reviewed   CBC WITH DIFFERENTIAL - Abnormal; Notable for the following:        Result Value    MCHC 33.0 (*)     Neutrophils-Polys 77.00 (*)     Lymphocytes 13.20 (*)     All other components within normal limits   BASIC METABOLIC PANEL - Abnormal; Notable for the following:     Sodium 134 (*)     Potassium 3.5 (*)     All other components within normal limits   URINALYSIS,CULTURE IF INDICATED - Abnormal; Notable for the following:     Ketones 40 (*)     All other components within normal limits   ESTIMATED GFR     All labs reviewed by " me.    RADIOLOGY  [id number] VIEWS   Final Result      No evidence of bowel obstruction.        The radiologist's interpretation of all radiological studies have been reviewed by me.    COURSE & MEDICAL DECISION MAKING  Nursing notes, VS, DANNYHx reviewed in chart.     11:30 AM Patient seen and examined at bedside. Differential diagnosis includes but is not limited to intra-abdominal infection, constipation, ileus, urinary tract infection.  Given her several days of symptoms without abnormal vital sign findings, I think bacterial infection is less likely. Ordered for screening laboratory tests and two-view abdominal x-ray.  To evaluate. Patient will be treated with Bentyl and Toradol for her symptoms.     11:45 AM this patient's labs and abdominal x-ray are unremarkable, though the abdominal x-ray does show somewhat increased.  End of gas and stool.  Her labs do not suggest infection.  As she describes, she is been having some sensation of constipation, which would be consistent with her symptoms.  She will be treated as such, with magnesium citrate once, and 3 days of MiraLAX.  We discussed very close return precautions at the bedside for any signs of infection including severe pain, fevers, or localization of pain.     The patient will return for new or worsening symptoms and is stable at the time of discharge.    The patient is referred to a primary physician for blood pressure management, diabetic screening, and for all other preventative health concerns.    DISPOSITION:  Patient will be discharged home in stable condition.    FOLLOW UP:  [facility], Emergency Dept  [address]  [place] [id number] [phone]  Call today  If symptoms worsen    Christian Barnett M.D.  [address]  [facility] [id number] [phone]            OUTPATIENT MEDICATIONS:  Discharge Medication List as of 12/10/2018 12:07 PM      START taking these medications    Details   dicyclomine (BENTYL) 10  MG Cap Take 1 Cap by mouth 4 Times a Day,Before Meals and at Bedtime., Disp-30 Cap, R-0, Normal      magnesium citrate Solution Take 300 mL by mouth Once for 1 dose., Disp-1 Bottle, R-0, Normal      polyethylene glycol/lytes (MIRALAX) Pack Take 1 Packet by mouth every day for 3 days., Disp-3 Each, R-0, Normal               FINAL IMPRESSION  1. Lower abdominal pain Active

## 2018-12-10 NOTE — ED NOTES
Pt medicated with Toradol, waiting a few minutes before DC since it was the first time she received medication.

## 2019-03-21 ENCOUNTER — OFFICE VISIT (OUTPATIENT)
Dept: MEDICAL GROUP | Facility: PHYSICIAN GROUP | Age: 64
End: 2019-03-21
Payer: COMMERCIAL

## 2019-03-21 VITALS
TEMPERATURE: 98.2 F | WEIGHT: 115 LBS | DIASTOLIC BLOOD PRESSURE: 80 MMHG | RESPIRATION RATE: 14 BRPM | BODY MASS INDEX: 21.16 KG/M2 | SYSTOLIC BLOOD PRESSURE: 140 MMHG | OXYGEN SATURATION: 97 % | HEIGHT: 62 IN | HEART RATE: 60 BPM

## 2019-03-21 DIAGNOSIS — F41.9 ANXIETY: ICD-10-CM

## 2019-03-21 DIAGNOSIS — I10 ESSENTIAL HYPERTENSION: ICD-10-CM

## 2019-03-21 DIAGNOSIS — Z12.31 ENCOUNTER FOR SCREENING MAMMOGRAM FOR BREAST CANCER: ICD-10-CM

## 2019-03-21 DIAGNOSIS — B00.9 HERPES SIMPLEX: ICD-10-CM

## 2019-03-21 DIAGNOSIS — B00.9 HERPES SIMPLEX INFECTION OF SKIN: ICD-10-CM

## 2019-03-21 DIAGNOSIS — K21.9 GASTROESOPHAGEAL REFLUX DISEASE WITHOUT ESOPHAGITIS: ICD-10-CM

## 2019-03-21 DIAGNOSIS — R73.01 ELEVATED FASTING GLUCOSE: ICD-10-CM

## 2019-03-21 PROBLEM — D72.819 LEUKOPENIA: Status: RESOLVED | Noted: 2017-04-27 | Resolved: 2019-03-21

## 2019-03-21 PROCEDURE — 99214 OFFICE O/P EST MOD 30 MIN: CPT | Performed by: INTERNAL MEDICINE

## 2019-03-21 RX ORDER — ACYCLOVIR 200 MG/1
400 CAPSULE ORAL 3 TIMES DAILY PRN
Qty: 60 CAP | Refills: 4 | Status: SHIPPED | OUTPATIENT
Start: 2019-03-21 | End: 2019-09-23 | Stop reason: SDUPTHER

## 2019-03-21 RX ORDER — LOSARTAN POTASSIUM 100 MG/1
100 TABLET ORAL DAILY
Qty: 90 TAB | Refills: 3 | Status: SHIPPED | OUTPATIENT
Start: 2019-03-21 | End: 2019-09-23 | Stop reason: SDUPTHER

## 2019-03-21 NOTE — ASSESSMENT & PLAN NOTE
BP is elevated today. I did personally recheck BP and it is 152/84.  She is on losartan 100 mg daily.  She had tried amlodipine and caused her swelling. She denies chest pain, shortness of breath, leg swelling, blurry vision. She contributes hypertension or salt and stress at work. She states BP is better in the morning. She does want to be on medications .

## 2019-03-21 NOTE — ASSESSMENT & PLAN NOTE
She tells me that her stress level is still very high. Her job is very stressful. She works for [employer]. She has no founds of how to keep the program open. However it is required by law to have the program. 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 Counseling provided. Pt knows how to talk to herself, relaxing technic

## 2019-03-22 NOTE — PROGRESS NOTES
Subjective:   Praveena Landers is a 63 y.o. female here today for hypertension, lab work     Hypertension  BP is elevated today. I did personally recheck BP and it is 152/84.  She is on losartan 100 mg daily.  She had tried amlodipine and caused her swelling. She denies chest pain, shortness of breath, leg swelling, blurry vision. She contributes hypertension or salt and stress at work. She states BP is better in the morning. She does want to be on medications .    Herpes simplex  Recurrent cold sore. She takes acyclovir prn. She is asking for refill.     Elevated fasting glucose  Repeat lab work 12/2018, normal. Continue to monitor. She is trying to eat healthier     Anxiety  She tells me that her stress level is still very high. Her job is very stressful. She works for [employer]. She has no founds of how to keep the program open. However it is required by law to have the program. 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 Counseling provided. Pt knows how to talk to herself, relaxing technic        Current medicines (including changes today)  Current Outpatient Prescriptions   Medication Sig Dispense Refill   • losartan (COZAAR) 100 MG Tab Take 1 Tab by mouth every day. 90 Tab 3   • acyclovir (ZOVIRAX) 200 MG Cap Take 2 Caps by mouth 3 times a day as needed. 60 Cap 4   • dicyclomine (BENTYL) 10 MG Cap Take 1 Cap by mouth 4 Times a Day,Before Meals and at Bedtime. 30 Cap 0   • Probiotic Product (PROBIOTIC DAILY PO) Take  by mouth.     • Multiple Vitamins-Minerals (MULTIPLE VITAMINS/WOMENS PO) Take  by mouth.     • FOLIC ACID PO Take  by mouth.     • BIOTIN PO Take  by mouth.     • coenzyme Q-10 30 MG capsule Take 60 mg by mouth every day.     • Cholecalciferol (VITAMIN D) 2000 UNITS CAPS Take 1 Cap by mouth.     • estradiol (ESTRACE) 1 MG TABS Take 1 mg by mouth every day.       No current facility-administered medications for this visit.      She  has a past medical history of GERD (gastroesophageal  reflux disease) (10/3/2014); Herpes simplex; and Hypertension.    Current Outpatient Prescriptions   Medication Sig Dispense Refill   • losartan (COZAAR) 100 MG Tab Take 1 Tab by mouth every day. 90 Tab 3   • acyclovir (ZOVIRAX) 200 MG Cap Take 2 Caps by mouth 3 times a day as needed. 60 Cap 4   • dicyclomine (BENTYL) 10 MG Cap Take 1 Cap by mouth 4 Times a Day,Before Meals and at Bedtime. 30 Cap 0   • Probiotic Product (PROBIOTIC DAILY PO) Take  by mouth.     • Multiple Vitamins-Minerals (MULTIPLE VITAMINS/WOMENS PO) Take  by mouth.     • FOLIC ACID PO Take  by mouth.     • BIOTIN PO Take  by mouth.     • coenzyme Q-10 30 MG capsule Take 60 mg by mouth every day.     • Cholecalciferol (VITAMIN D) 2000 UNITS CAPS Take 1 Cap by mouth.     • estradiol (ESTRACE) 1 MG TABS Take 1 mg by mouth every day.       No current facility-administered medications for this visit.        Allergies as of 03/21/2019 - Reviewed 03/21/2019   Allergen Reaction Noted   • Amlodipine Swelling 05/24/2017   • Benazepril  12/28/2015   • Penicillins  10/03/2014       Social History     Social History   • Marital status: Single     Spouse name: N/A   • Number of children: N/A   • Years of education: N/A     Occupational History   • Not on file.     Social History Main Topics   • Smoking status: Never Smoker   • Smokeless tobacco: Never Used   • Alcohol use Yes      Comment: 1gl wine daily   • Drug use: No   • Sexual activity: Yes     Partners: Male     Other Topics Concern   • Not on file     Social History Narrative   • No narrative on file        Family History   Problem Relation Age of Onset   • Cancer Mother         breast   • Cancer Father         lung   • Hypertension Father    • Diabetes Paternal Uncle    • Heart Disease Neg Hx    • Stroke Neg Hx    • Cancer Sister 62        colon cancer       Past Surgical History:   Procedure Laterality Date   • ABDOMINAL HYSTERECTOMY TOTAL  2001    partial, precancer cervix   • LUMPECTOMY  age 19    R,  "benign   • WRIST EXPLORATION      Right       ROS   All systems reviewed are negative except for HPI       Objective:     Blood pressure 140/80, pulse 60, temperature 36.8 °C (98.2 °F), resp. rate 14, height 1.575 m (5' 2\"), weight 52.2 kg (115 lb), SpO2 97 %, not currently breastfeeding. Body mass index is 21.03 kg/m².   Physical Exam:  Constitutional: Alert, no distress.  Skin: Warm, dry, good turgor, no rashes in visible areas.  Eye: Equal, round and reactive, conjunctiva clear, lids normal.  ENMT: Lips without lesions, good dentition, oropharynx clear.  Neck: Trachea midline, no masses, no thyromegaly. No cervical or supraclavicular lymphadenopathy  Respiratory: Unlabored respiratory effort, lungs clear to auscultation, no wheezes, no ronchi.  Cardiovascular: Normal S1, S2, no murmur, no edema.  Abdomen: Soft, non-tender, no masses, no hepatosplenomegaly.  Psych: Alert and oriented x3, normal affect and mood.        Assessment and Plan:   The following treatment plan was discussed    1. Essential hypertension  I did advise to continue same medications. I did offer to add HCTZ, but pt is hesitant to that. She would like to manage with biofeedback, avoid salt., being healthy   - losartan (COZAAR) 100 MG Tab; Take 1 Tab by mouth every day.  Dispense: 90 Tab; Refill: 3    2. Gastroesophageal reflux disease without esophagitis  No symptoms. Doing better. Not on medications     3. Elevated fasting glucose  No signs of diabetes, continue to monitor     4. Anxiety  Counseling as per above. Continue to monitor. I did discuss non hormonal therapy hot flashes and anxiety. She would like to discuss with GYN first. She prefers to not take medications     5. Encounter for screening mammogram for breast cancer  She has scheduled already apt.     6. Herpes simplex infection of skin  Refill provided.   - acyclovir (ZOVIRAX) 200 MG Cap; Take 2 Caps by mouth 3 times a day as needed.  Dispense: 60 Cap; Refill: 4      Followup: " Return in about 6 months (around 9/21/2019), or if symptoms worsen or fail to improve, for Short.

## 2019-09-23 ENCOUNTER — OFFICE VISIT (OUTPATIENT)
Dept: MEDICAL GROUP | Facility: MEDICAL CENTER | Age: 64
End: 2019-09-23
Payer: COMMERCIAL

## 2019-09-23 VITALS
WEIGHT: 112 LBS | DIASTOLIC BLOOD PRESSURE: 68 MMHG | SYSTOLIC BLOOD PRESSURE: 138 MMHG | RESPIRATION RATE: 14 BRPM | HEART RATE: 78 BPM | BODY MASS INDEX: 20.61 KG/M2 | HEIGHT: 62 IN | TEMPERATURE: 97.8 F | OXYGEN SATURATION: 98 %

## 2019-09-23 DIAGNOSIS — Z12.39 SCREENING FOR BREAST CANCER: ICD-10-CM

## 2019-09-23 DIAGNOSIS — L98.8 SKIN PLAQUE: ICD-10-CM

## 2019-09-23 DIAGNOSIS — B00.9 HERPES SIMPLEX INFECTION OF SKIN: ICD-10-CM

## 2019-09-23 DIAGNOSIS — F41.9 ANXIETY: ICD-10-CM

## 2019-09-23 DIAGNOSIS — I10 ESSENTIAL HYPERTENSION: ICD-10-CM

## 2019-09-23 DIAGNOSIS — Z79.890 HORMONE REPLACEMENT THERAPY (HRT): ICD-10-CM

## 2019-09-23 PROBLEM — R73.01 ELEVATED FASTING GLUCOSE: Status: RESOLVED | Noted: 2017-04-27 | Resolved: 2019-09-23

## 2019-09-23 PROCEDURE — 99213 OFFICE O/P EST LOW 20 MIN: CPT | Performed by: FAMILY MEDICINE

## 2019-09-23 RX ORDER — LOSARTAN POTASSIUM 100 MG/1
100 TABLET ORAL DAILY
Qty: 90 TAB | Refills: 3 | Status: SHIPPED | OUTPATIENT
Start: 2019-09-23 | End: 2020-02-16 | Stop reason: SDUPTHER

## 2019-09-23 RX ORDER — ACYCLOVIR 200 MG/1
400 CAPSULE ORAL 3 TIMES DAILY PRN
Qty: 60 CAP | Refills: 4 | Status: SHIPPED | OUTPATIENT
Start: 2019-09-23 | End: 2021-02-12

## 2019-09-23 ASSESSMENT — PATIENT HEALTH QUESTIONNAIRE - PHQ9: CLINICAL INTERPRETATION OF PHQ2 SCORE: 0

## 2019-09-23 NOTE — ASSESSMENT & PLAN NOTE
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

## 2019-09-23 NOTE — ASSESSMENT & PLAN NOTE
I am unsure   Extensor surface  Hypopigmented  ddx verrucca LP atypical presentation, psoriasis atypical presentation

## 2019-09-23 NOTE — PROGRESS NOTES
This medical record contains text that has been entered with the assistance of computer voice recognition and dictation software.  Therefore, it may contain unintended errors in text, spelling, punctuation, or grammar        Chief Complaint   Patient presents with   • Establish Care     needs new pcp        Praveena Landers is a 64 y.o. female here evaluation and management of:      est care  HTN   New issue skin lesions elbows  HRT       Current Outpatient Medications   Medication Sig Dispense Refill   • losartan (COZAAR) 100 MG Tab Take 1 Tab by mouth every day. 90 Tab 3   • acyclovir (ZOVIRAX) 200 MG Cap Take 2 Caps by mouth 3 times a day as needed. 60 Cap 4   • Probiotic Product (PROBIOTIC DAILY PO) Take  by mouth.     • Multiple Vitamins-Minerals (MULTIPLE VITAMINS/WOMENS PO) Take  by mouth.     • FOLIC ACID PO Take  by mouth.     • BIOTIN PO Take  by mouth.     • coenzyme Q-10 30 MG capsule Take 60 mg by mouth every day.     • Cholecalciferol (VITAMIN D) 2000 UNITS CAPS Take 1 Cap by mouth.     • estradiol (ESTRACE) 1 MG TABS Take 1 mg by mouth every day.       No current facility-administered medications for this visit.      Patient Active Problem List    Diagnosis Date Noted   • Skin plaque 09/23/2019   • Herpes simplex infection of skin 09/23/2019   • Hormone replacement therapy (HRT) 09/23/2019   • Anxiety 04/27/2017   • Screening for breast cancer 10/25/2016   • Herpes simplex 10/03/2014   • Hypertension 07/09/2012     Past Surgical History:   Procedure Laterality Date   • ABDOMINAL HYSTERECTOMY TOTAL  2001    partial, precancer cervix   • LUMPECTOMY  age 19    R, benign   • WRIST EXPLORATION      Right      Social History     Tobacco Use   • Smoking status: Never Smoker   • Smokeless tobacco: Never Used   Substance Use Topics   • Alcohol use: Yes     Comment: 1gl wine daily   • Drug use: No     Family History   Problem Relation Age of Onset   • Cancer Mother         breast   • Cancer Father          "lung   • Hypertension Father    • Diabetes Paternal Uncle    • Heart Disease Neg Hx    • Stroke Neg Hx    • Cancer Sister 62        colon cancer           ROS  No headache  all review of system completed and negative except for those listed above     Objective:     /68 (BP Location: Left arm, Patient Position: Sitting, BP Cuff Size: Adult)   Pulse 78   Temp 36.6 °C (97.8 °F) (Temporal)   Resp 14   Ht 1.575 m (5' 2\")   Wt 50.8 kg (112 lb)   SpO2 98%  Body mass index is 20.49 kg/m².  Physical Exam:    Constitutional: Alert, no distress.  Skin: Warm, dry, good turgor, no rashes in visible areas.  Eye: Equal, round and reactive, conjunctiva clear, lids normal.  ENMT: Lips without lesions, good dentition, oropharynx clear.  Neck: Trachea midline, no masses, no thyromegaly. No cervical or supraclavicular lymphadenopathy.  Respiratory: Unlabored respiratory effort, lungs clear to auscultation, no wheezes, no ronchi.  Cardiovascular: Normal S1, S2, no murmur, no edema.  Abdomen: Soft, non-tender, no masses, no hepatosplenomegaly.  Psych: Alert and oriented x3, normal affect and mood.      Skin   She has flesh colored plaques papules on her elbows           Assessment and Plan:   The following treatment plan was discussed        Problem List Items Addressed This Visit     Hypertension     Borderline but improving   No changes to meds today  Labs and clinic visits q6 mo              Relevant Medications    losartan (COZAAR) 100 MG Tab    Other Relevant Orders    Basic Metabolic Panel    Lipid Profile    MICROALBUMIN CREAT RATIO URINE    Basic Metabolic Panel    Lipid Profile    MICROALBUMIN CREAT RATIO URINE    Screening for breast cancer    Relevant Orders    MA-SCREEN MAMMO W/CAD-BILAT    Anxiety     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               Skin plaque     I am unsure   Extensor surface  Hypopigmented  ddx verrucca LP atypical presentation, " psoriasis atypical presentation                Relevant Orders    REFERRAL TO DERMATOLOGY    Herpes simplex infection of skin    Relevant Medications    acyclovir (ZOVIRAX) 200 MG Cap    Hormone replacement therapy (HRT)     She is weaning off  We discuss correlation between htn and exogenous estrogen   Not a true contraindication but we do observe that her BP has been coming down as she weans down on estrace      She has had hysterectomy     We discuss natural ways of avoiding hot flashes carb reduction CV exercise caffeine reduction                        Instructed to follow up if symptoms worsen or fail to improve, ER/UC precautions discussed as well    Alexus Nicole MD  [facility], Family Medicine   [address] [place] [address]   [place] [address]  Phone: [phone]

## 2019-10-10 ENCOUNTER — HOSPITAL ENCOUNTER (OUTPATIENT)
Dept: RADIOLOGY | Facility: MEDICAL CENTER | Age: 64
End: 2019-10-10
Attending: FAMILY MEDICINE
Payer: COMMERCIAL

## 2019-10-10 DIAGNOSIS — Z12.39 SCREENING FOR BREAST CANCER: ICD-10-CM

## 2019-10-10 PROCEDURE — 77063 BREAST TOMOSYNTHESIS BI: CPT

## 2019-10-14 ENCOUNTER — TELEPHONE (OUTPATIENT)
Dept: MEDICAL GROUP | Facility: MEDICAL CENTER | Age: 64
End: 2019-10-14

## 2019-10-14 DIAGNOSIS — R92.8 ABNORMAL FINDING ON BREAST IMAGING: ICD-10-CM

## 2019-10-16 ENCOUNTER — HOSPITAL ENCOUNTER (OUTPATIENT)
Dept: RADIOLOGY | Facility: MEDICAL CENTER | Age: 64
End: 2019-10-16

## 2019-10-21 ENCOUNTER — TELEPHONE (OUTPATIENT)
Dept: MEDICAL GROUP | Facility: MEDICAL CENTER | Age: 64
End: 2019-10-21

## 2019-10-21 DIAGNOSIS — N64.59 ABNORMAL BREAST EXAM: ICD-10-CM

## 2020-01-13 ENCOUNTER — APPOINTMENT (RX ONLY)
Dept: URBAN - METROPOLITAN AREA CLINIC 20 | Facility: CLINIC | Age: 65
Setting detail: DERMATOLOGY
End: 2020-01-13

## 2020-01-13 DIAGNOSIS — L30.1 DYSHIDROSIS [POMPHOLYX]: ICD-10-CM | Status: INADEQUATELY CONTROLLED

## 2020-01-13 DIAGNOSIS — L73.9 FOLLICULAR DISORDER, UNSPECIFIED: ICD-10-CM | Status: RESOLVING

## 2020-01-13 DIAGNOSIS — L738 OTHER SPECIFIED DISEASES OF HAIR AND HAIR FOLLICLES: ICD-10-CM | Status: RESOLVING

## 2020-01-13 DIAGNOSIS — L663 OTHER SPECIFIED DISEASES OF HAIR AND HAIR FOLLICLES: ICD-10-CM | Status: RESOLVING

## 2020-01-13 PROBLEM — D23.62 OTHER BENIGN NEOPLASM OF SKIN OF LEFT UPPER LIMB, INCLUDING SHOULDER: Status: ACTIVE | Noted: 2020-01-13

## 2020-01-13 PROBLEM — D23.61 OTHER BENIGN NEOPLASM OF SKIN OF RIGHT UPPER LIMB, INCLUDING SHOULDER: Status: ACTIVE | Noted: 2020-01-13

## 2020-01-13 PROBLEM — L02.821 FURUNCLE OF HEAD [ANY PART, EXCEPT FACE]: Status: ACTIVE | Noted: 2020-01-13

## 2020-01-13 PROCEDURE — ? ADDITIONAL NOTES

## 2020-01-13 PROCEDURE — ? COUNSELING

## 2020-01-13 PROCEDURE — ? PRESCRIPTION

## 2020-01-13 PROCEDURE — 99202 OFFICE O/P NEW SF 15 MIN: CPT

## 2020-01-13 RX ORDER — CLOBETASOL PROPIONATE 0.5 MG/G
CREAM TOPICAL BID
Qty: 2 | Refills: 1 | Status: ERX | COMMUNITY
Start: 2020-01-13

## 2020-01-13 RX ADMIN — CLOBETASOL PROPIONATE 1: 0.5 CREAM TOPICAL at 00:00

## 2020-01-13 ASSESSMENT — LOCATION SIMPLE DESCRIPTION DERM
LOCATION SIMPLE: RIGHT PLANTAR SURFACE
LOCATION SIMPLE: LEFT 3RD TOE
LOCATION SIMPLE: LEFT SCALP
LOCATION SIMPLE: RIGHT FOOT
LOCATION SIMPLE: LEFT FOOT
LOCATION SIMPLE: LEFT PLANTAR SURFACE
LOCATION SIMPLE: RIGHT 3RD TOE

## 2020-01-13 ASSESSMENT — LOCATION DETAILED DESCRIPTION DERM
LOCATION DETAILED: LEFT MEDIAL FRONTAL SCALP
LOCATION DETAILED: RIGHT PLANTAR FOREFOOT OVERLYING 1ST METATARSAL
LOCATION DETAILED: LEFT MEDIAL DORSAL FOOT
LOCATION DETAILED: LEFT PLANTAR FOREFOOT OVERLYING 1ST METATARSAL
LOCATION DETAILED: RIGHT MEDIAL DORSAL FOOT
LOCATION DETAILED: RIGHT DORSAL 3RD TOE
LOCATION DETAILED: LEFT MEDIAL 3RD TOE

## 2020-01-13 ASSESSMENT — LOCATION ZONE DERM
LOCATION ZONE: SCALP
LOCATION ZONE: FEET
LOCATION ZONE: TOE

## 2020-02-05 ENCOUNTER — OFFICE VISIT (OUTPATIENT)
Dept: MEDICAL GROUP | Facility: MEDICAL CENTER | Age: 65
End: 2020-02-05
Payer: COMMERCIAL

## 2020-02-05 VITALS
SYSTOLIC BLOOD PRESSURE: 118 MMHG | OXYGEN SATURATION: 98 % | RESPIRATION RATE: 14 BRPM | WEIGHT: 113 LBS | HEART RATE: 73 BPM | BODY MASS INDEX: 20.8 KG/M2 | TEMPERATURE: 97.8 F | HEIGHT: 62 IN | DIASTOLIC BLOOD PRESSURE: 76 MMHG

## 2020-02-05 DIAGNOSIS — Z11.59 NEED FOR HEPATITIS C SCREENING TEST: ICD-10-CM

## 2020-02-05 DIAGNOSIS — R10.32 LLQ ABDOMINAL PAIN: ICD-10-CM

## 2020-02-05 DIAGNOSIS — Z12.11 SCREENING FOR COLON CANCER: ICD-10-CM

## 2020-02-05 DIAGNOSIS — I10 ESSENTIAL HYPERTENSION: ICD-10-CM

## 2020-02-05 DIAGNOSIS — R07.89 LEFT-SIDED CHEST WALL PAIN: ICD-10-CM

## 2020-02-05 DIAGNOSIS — K92.1 BLOODY STOOL: ICD-10-CM

## 2020-02-05 PROCEDURE — 99214 OFFICE O/P EST MOD 30 MIN: CPT | Performed by: FAMILY MEDICINE

## 2020-02-05 ASSESSMENT — PATIENT HEALTH QUESTIONNAIRE - PHQ9: CLINICAL INTERPRETATION OF PHQ2 SCORE: 0

## 2020-02-05 NOTE — ASSESSMENT & PLAN NOTE
Was told on colonoscopy that she had diverticulosis that could present as diverticulitis   She has had 2 episodes in the past 10 year (most recently this past December and also in 2018 ) where she developed bloody diarrhea no fever but with LLQ pain these symptoms resolved with broth diet spontaneously in 2-3 days.. since she states she is feeling well today but I would like her to have clear plan for work up if her symptoms recur or fail to improve which would include strict ER precautions for acute or worsening symptoms and pelvic US CT scan labs and short term follow up with me in clinic which we do schedule today

## 2020-02-05 NOTE — ASSESSMENT & PLAN NOTE
Sounds like intercostal neuritis  But we should do CXR for reassurance  As well as lab work     Strict return precautions and ER precautions reviewed

## 2020-02-05 NOTE — ASSESSMENT & PLAN NOTE
Poorly controlled-->Borderline-->excellent today as we recently weaned off systemic HRT    No changes to meds today  Labs and clinic visits q6 mo

## 2020-02-05 NOTE — PROGRESS NOTES
"This medical record contains text that has been entered with the assistance of computer voice recognition and dictation software.  Therefore, it may contain unintended errors in text, spelling, punctuation, or grammar        Chief Complaint   Patient presents with   • LLQ Pain     LLQ PX in abdomen bloody stools once during mai        Praveena Landers is a 64 y.o. female here evaluation and management of:     She has history of HTN  - related to this we have weaned her down and off HRT and notice much better HTN control       She has two new issues she would like to discuss    \"my diverticulitis is acting up again\"  She states that in the remote past 10 years ago was told she has diverticulosis  Had episode 2 years ago of LLQ abd pain bloody stool went to ER was told to adjust diet and she noted self resolution of symptoms in about 2-3 days   Similar episode over christmas lasting 3-4 days   LLQ pain bloody bm's diarrhea no fever   She states that she is 100% improved now but still wanting to understand what these symptoms represent worried about her \"ovaries and kidneys\"    She additionally for past month or so has noticed sharp burning pain which is mild on L chest wall radiates around her L chest wall   persistent associated with holding her grandson on her hip   Improved with distraction       Current Outpatient Medications   Medication Sig Dispense Refill   • losartan (COZAAR) 100 MG Tab Take 1 Tab by mouth every day. 90 Tab 3   • acyclovir (ZOVIRAX) 200 MG Cap Take 2 Caps by mouth 3 times a day as needed. 60 Cap 4   • Probiotic Product (PROBIOTIC DAILY PO) Take  by mouth.     • Multiple Vitamins-Minerals (MULTIPLE VITAMINS/WOMENS PO) Take  by mouth.     • FOLIC ACID PO Take  by mouth.     • BIOTIN PO Take  by mouth.     • coenzyme Q-10 30 MG capsule Take 60 mg by mouth every day.     • Cholecalciferol (VITAMIN D) 2000 UNITS CAPS Take 1 Cap by mouth.     • estradiol (ESTRACE) 1 MG TABS Take 1 mg by " "mouth every day.       No current facility-administered medications for this visit.      Patient Active Problem List    Diagnosis Date Noted   • LLQ abdominal pain 02/05/2020   • Bloody stool 02/05/2020   • Left-sided chest wall pain 02/05/2020   • Skin plaque 09/23/2019   • Herpes simplex infection of skin 09/23/2019   • Hormone replacement therapy (HRT) 09/23/2019   • Anxiety 04/27/2017   • Screening for breast cancer 10/25/2016   • Herpes simplex 10/03/2014   • Hypertension 07/09/2012     Past Surgical History:   Procedure Laterality Date   • ABDOMINAL HYSTERECTOMY TOTAL  2001    partial, precancer cervix   • LUMPECTOMY  age 19    R, benign   • WRIST EXPLORATION      Right      Social History     Tobacco Use   • Smoking status: Never Smoker   • Smokeless tobacco: Never Used   Substance Use Topics   • Alcohol use: Yes     Comment: 1gl wine daily   • Drug use: No     Family History   Problem Relation Age of Onset   • Cancer Mother         breast   • Cancer Father         lung   • Hypertension Father    • Diabetes Paternal Uncle    • Heart Disease Neg Hx    • Stroke Neg Hx    • Cancer Sister 62        colon cancer           ROS  No headache  all review of system completed and negative except for those listed above     Objective:     /76 (BP Location: Right arm, Patient Position: Sitting, BP Cuff Size: Adult)   Pulse 73   Temp 36.6 °C (97.8 °F) (Temporal)   Resp 14   Ht 1.575 m (5' 2\")   Wt 51.3 kg (113 lb)   SpO2 98%  Body mass index is 20.67 kg/m².  Physical Exam:    Constitutional: Alert, no distress.  Skin: Warm, dry, good turgor, no rashes in visible areas.  Eye: Equal, round and reactive, conjunctiva clear, lids normal.  ENMT: Lips without lesions, good dentition, oropharynx clear.  Neck: Trachea midline, no masses, no thyromegaly. No cervical or supraclavicular lymphadenopathy.  Respiratory: Unlabored respiratory effort, lungs clear to auscultation, no wheezes, no ronchi.  Cardiovascular: Normal " S1, S2, no murmur, no edema.  Abdomen: Soft, non-tender, no masses, no hepatosplenomegaly.  Psych: Alert and oriented x3, normal affect and mood.      Lower extremity strength and gait normal       Assessment and Plan:   The following treatment plan was discussed        Problem List Items Addressed This Visit     Hypertension     Poorly controlled-->Borderline-->excellent today as we recently weaned off systemic HRT    No changes to meds today  Labs and clinic visits q6 mo              LLQ abdominal pain     Was told on colonoscopy that she had diverticulosis that could present as diverticulitis   She has had 2 episodes in the past 10 year (most recently this past December and also in 2018 ) where she developed bloody diarrhea no fever but with LLQ pain these symptoms resolved with broth diet spontaneously in 2-3 days.. since she states she is feeling well today but I would like her to have clear plan for work up if her symptoms recur or fail to improve which would include strict ER precautions for acute or worsening symptoms and pelvic US CT scan labs and short term follow up with me in clinic which we do schedule today                    Relevant Orders    CT-ABDOMEN WITH & W/O    US-PELVIC COMPLETE (TRANSABDOMINAL/TRANSVAGINAL) (COMBO)    Comp Metabolic Panel    LIPASE    Sed Rate    CRP QUANTITIVE (NON-CARDIAC)    CBC WITH DIFFERENTIAL    OCCULT BLOOD FECES IMMUNOASSAY    CULTURE STOOL    Bloody stool    Relevant Orders    CT-ABDOMEN WITH & W/O    US-PELVIC COMPLETE (TRANSABDOMINAL/TRANSVAGINAL) (COMBO)    Comp Metabolic Panel    LIPASE    Sed Rate    CRP QUANTITIVE (NON-CARDIAC)    CBC WITH DIFFERENTIAL    OCCULT BLOOD FECES IMMUNOASSAY    CULTURE STOOL    Left-sided chest wall pain     Sounds like intercostal neuritis  But we should do CXR for reassurance  As well as lab work     Strict return precautions and ER precautions reviewed           Relevant Orders    DX-CHEST-2 VIEWS      Other Visit Diagnoses      Screening for colon cancer        Relevant Orders    REFERRAL TO GI FOR COLONOSCOPY                Instructed to follow up if symptoms worsen or fail to improve, ER/UC precautions discussed as well    Alexus Nicole MD  [facility], Family [address]   [place] [address]  Phone: [phone]

## 2020-02-07 ENCOUNTER — PATIENT MESSAGE (OUTPATIENT)
Dept: MEDICAL GROUP | Facility: MEDICAL CENTER | Age: 65
End: 2020-02-07

## 2020-02-07 DIAGNOSIS — K92.1 BLOODY STOOL: ICD-10-CM

## 2020-02-07 DIAGNOSIS — Z12.11 SCREENING FOR COLON CANCER: ICD-10-CM

## 2020-02-16 DIAGNOSIS — I10 ESSENTIAL HYPERTENSION: ICD-10-CM

## 2020-02-18 RX ORDER — LOSARTAN POTASSIUM 100 MG/1
100 TABLET ORAL DAILY
Qty: 90 TAB | Refills: 3 | Status: SHIPPED | OUTPATIENT
Start: 2020-02-18 | End: 2021-03-05

## 2020-02-27 ENCOUNTER — HOSPITAL ENCOUNTER (OUTPATIENT)
Dept: RADIOLOGY | Facility: MEDICAL CENTER | Age: 65
End: 2020-02-27
Attending: FAMILY MEDICINE
Payer: COMMERCIAL

## 2020-02-27 DIAGNOSIS — R10.32 LLQ ABDOMINAL PAIN: ICD-10-CM

## 2020-02-27 DIAGNOSIS — K92.1 BLOODY STOOL: ICD-10-CM

## 2020-02-27 PROCEDURE — 76830 TRANSVAGINAL US NON-OB: CPT

## 2020-03-11 ENCOUNTER — OFFICE VISIT (OUTPATIENT)
Dept: MEDICAL GROUP | Facility: MEDICAL CENTER | Age: 65
End: 2020-03-11
Payer: COMMERCIAL

## 2020-03-11 VITALS
DIASTOLIC BLOOD PRESSURE: 60 MMHG | HEIGHT: 62 IN | OXYGEN SATURATION: 97 % | BODY MASS INDEX: 20.97 KG/M2 | HEART RATE: 62 BPM | TEMPERATURE: 97.9 F | WEIGHT: 113.98 LBS | SYSTOLIC BLOOD PRESSURE: 124 MMHG

## 2020-03-11 DIAGNOSIS — K57.90 DIVERTICULOSIS: ICD-10-CM

## 2020-03-11 DIAGNOSIS — M25.559 ARTHRALGIA OF HIP, UNSPECIFIED LATERALITY: ICD-10-CM

## 2020-03-11 DIAGNOSIS — R07.89 LEFT-SIDED CHEST WALL PAIN: ICD-10-CM

## 2020-03-11 DIAGNOSIS — R10.32 LLQ ABDOMINAL PAIN: ICD-10-CM

## 2020-03-11 DIAGNOSIS — G58.8 INTERCOSTAL NEURITIS: ICD-10-CM

## 2020-03-11 PROCEDURE — 99214 OFFICE O/P EST MOD 30 MIN: CPT | Performed by: FAMILY MEDICINE

## 2020-03-11 RX ORDER — GABAPENTIN 100 MG/1
CAPSULE ORAL
Qty: 60 CAP | Refills: 3 | Status: SHIPPED | OUTPATIENT
Start: 2020-03-11 | End: 2020-07-07

## 2020-03-11 NOTE — ASSESSMENT & PLAN NOTE
Upon further contemplation she agrees with my initial assessment of intercostal neuritis  Very positional   Aggravated by carrying her grandson on her hip   Aggravated by prolonged sitting and also twisting motion   Relieved by chair pilates /chair yoga    Plan will be to initiate formal PT       Over 25 minutes spent with patient face to face, greater than 50% time spent with plan/coordination of care regarding that which is discussed in the HPI and A&P

## 2020-03-11 NOTE — ASSESSMENT & PLAN NOTE
We are reassured by her normal pelvic US   She has also consulted with GI and will complete colonoscopy in may       Her symptoms have been stable from this stand point

## 2020-03-11 NOTE — PROGRESS NOTES
"This medical record contains text that has been entered with the assistance of computer voice recognition and dictation software.  Therefore, it may contain unintended errors in text, spelling, punctuation, or grammar        Chief Complaint   Patient presents with   • Follow-Up     pain in side patient thinks its muscular       Praveena Landers is a 64 y.o. female here evaluation and management of: see above     She has history of HTN  - related to this we have weaned her down and off HRT and notice much better HTN control       She has two new issues she would like to discuss    Last visit  \"my diverticulitis is acting up again\"  Two episodes of LLQ pain and bloody diarrhea in 10 year period    Also having some additional sharp burning pain chest wall radiated around chest wall   Improved with distraction       We initiated broad work up including labs gi consult and pelvic US   We offered CT which she declined to do         Current Outpatient Medications   Medication Sig Dispense Refill   • gabapentin (NEURONTIN) 100 MG Cap Take 1-3 tabs at night 60 Cap 3   • losartan (COZAAR) 100 MG Tab Take 1 Tab by mouth every day. 90 Tab 3   • acyclovir (ZOVIRAX) 200 MG Cap Take 2 Caps by mouth 3 times a day as needed. 60 Cap 4   • Probiotic Product (PROBIOTIC DAILY PO) Take  by mouth.     • Multiple Vitamins-Minerals (MULTIPLE VITAMINS/WOMENS PO) Take  by mouth.     • FOLIC ACID PO Take  by mouth.     • BIOTIN PO Take  by mouth.     • Cholecalciferol (VITAMIN D) 2000 UNITS CAPS Take 1 Cap by mouth.     • estradiol (ESTRACE) 1 MG TABS Take 1 mg by mouth every day.     • coenzyme Q-10 30 MG capsule Take 60 mg by mouth every day.       No current facility-administered medications for this visit.      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   • Arthralgia of hip 03/11/2020   • Diverticulosis 03/11/2020   • LLQ abdominal pain 02/05/2020   • Bloody stool 02/05/2020   • Left-sided " "chest wall pain 02/05/2020   • Skin plaque 09/23/2019   • Herpes simplex infection of skin 09/23/2019   • Hormone replacement therapy (HRT) 09/23/2019   • Anxiety 04/27/2017   • Screening for breast cancer 10/25/2016   • Herpes simplex 10/03/2014   • Hypertension 07/09/2012     Past Surgical History:   Procedure Laterality Date   • ABDOMINAL HYSTERECTOMY TOTAL  2001    partial, precancer cervix   • LUMPECTOMY  age 19    R, benign   • WRIST EXPLORATION      Right      Social History     Tobacco Use   • Smoking status: Never Smoker   • Smokeless tobacco: Never Used   Substance Use Topics   • Alcohol use: Yes     Comment: 1gl wine daily   • Drug use: No     Family History   Problem Relation Age of Onset   • Cancer Mother         breast   • Cancer Father         lung   • Hypertension Father    • Diabetes Paternal Uncle    • Cancer Sister 62        colon cancer   • Heart Disease Neg Hx    • Stroke Neg Hx            ROS  No headache  all review of system completed and negative except for those listed above     Objective:     /60 (BP Location: Right arm, Patient Position: Sitting, BP Cuff Size: Adult)   Pulse 62   Temp 36.6 °C (97.9 °F) (Temporal)   Ht 1.575 m (5' 2\")   Wt 51.7 kg (113 lb 15.7 oz)   SpO2 97%  Body mass index is 20.85 kg/m².  Physical Exam:      GEN: comfortable, alert and oriented, well nourished, well developed, in no apparent distress   HEENT: NCAT, eyes: pupils equal and reactive, sclera white, EOMIT, good dentition  HEART: limbs warm and well perfused, regular rate, no JVD, no lower extremity edema  LUNGS: speaking in full sentences, not in apparent respiratory distress, no audible wheezes  MSK: normal tone and bulk, no swelling of the joints, gait steady and normal     Lower extremity strength and gait normal       Assessment and Plan:   The following treatment plan was discussed        Problem List Items Addressed This Visit     LLQ abdominal pain     We are reassured by her normal pelvic " US   She has also consulted with GI and will complete colonoscopy in may       Her symptoms have been stable from this stand point              Left-sided chest wall pain     Upon further contemplation she agrees with my initial assessment of intercostal neuritis  Very positional   Aggravated by carrying her grandson on her hip   Aggravated by prolonged sitting and also twisting motion   Relieved by chair pilates /chair yoga    Plan will be to initiate formal PT       Over 25 minutes spent with patient face to face, greater than 50% time spent with plan/coordination of care regarding that which is discussed in the HPI and A&P           Intercostal neuritis    Relevant Medications    gabapentin (NEURONTIN) 100 MG Cap    Other Relevant Orders    REFERRAL TO PHYSICAL THERAPY Reason for Therapy: Eval/Treat/Report    Arthralgia of hip    Relevant Orders    REFERRAL TO PHYSICAL THERAPY Reason for Therapy: Eval/Treat/Report    Diverticulosis     We discuss diet extensively   Including means for increasing fiber to 25 g daily                          Instructed to follow up if symptoms worsen or fail to improve, ER/UC precautions discussed as well    Alexus Nicole MD  [facility], Family Medicine   [address] [place] [address] [address]   [place] [address]  Phone: [phone]

## 2020-03-31 ENCOUNTER — OFFICE VISIT (OUTPATIENT)
Dept: MEDICAL GROUP | Facility: MEDICAL CENTER | Age: 65
End: 2020-03-31
Payer: COMMERCIAL

## 2020-03-31 DIAGNOSIS — K59.09 OTHER CONSTIPATION: ICD-10-CM

## 2020-03-31 DIAGNOSIS — K62.5 BRBPR (BRIGHT RED BLOOD PER RECTUM): ICD-10-CM

## 2020-03-31 DIAGNOSIS — R10.32 LLQ ABDOMINAL PAIN: ICD-10-CM

## 2020-03-31 PROCEDURE — 99214 OFFICE O/P EST MOD 30 MIN: CPT | Mod: 95,CR | Performed by: FAMILY MEDICINE

## 2020-03-31 RX ORDER — METRONIDAZOLE 500 MG/1
500 TABLET ORAL 3 TIMES DAILY
Qty: 42 TAB | Refills: 0 | Status: SHIPPED | OUTPATIENT
Start: 2020-03-31 | End: 2020-04-14

## 2020-03-31 RX ORDER — CIPROFLOXACIN 500 MG/1
500 TABLET, FILM COATED ORAL 2 TIMES DAILY
Qty: 28 TAB | Refills: 0 | Status: SHIPPED | OUTPATIENT
Start: 2020-03-31 | End: 2020-04-14

## 2020-03-31 NOTE — ASSESSMENT & PLAN NOTE
This is really an acute on chronic issue   She has had a few episodes of LLQ pain associated with constipation and BRBPR  She states her symptoms resolve when she is more active hydrated eating more fiber     We are reassured by her normal pelvic US   She has also consulted with GI and will complete colonoscopy next month     We have placed order for CT scan but she would like to discuss this with GI first    I doubt that she needs abx to cover for diverticulitis at this point as she is not having high fevers, but I do call some in , apparently she has had reports of diverticulosis on colonoscopy but no ct confirmed diverticulitis    Strict ER precautions discussed    Over 25 minutes spent with patient face to face, greater than 50% time spent with plan/coordination of care regarding that which is discussed in the HPI and A&P        Her symptoms have been stable from this stand point

## 2020-03-31 NOTE — PROGRESS NOTES
"Telemedicine Visit: Established Patient     This encounter was conducted via Zoom .   Verbal consent was obtained. Patient's identity was verified.    Subjective:   CC: bright red blood per rectum with hard BM   Praveena Landers is a 65 y.o. female presenting for evaluation and management of:    She is endorsing symptoms of intermittent constipation LLQ pain and bloody bm x 1-2 episodes in the past week   Relieved with fiber  She is quite constipated but states she has been \"stuck in the house and not as active as usual\"  Pain is persistent and localized to LLQ   No fever  No wt loss unintentional   No n/v   Able to keep up with fluids     ROS   Denies any recent fevers or chills. No nausea or vomiting. No chest pains or shortness of breath.     Allergies   Allergen Reactions   • Amlodipine Swelling   • Benazepril      Cough   • Penicillins        Current medicines (including changes today)  Current Outpatient Medications   Medication Sig Dispense Refill   • ciprofloxacin (CIPRO) 500 MG Tab Take 1 Tab by mouth 2 times a day for 14 days. 28 Tab 0   • metroNIDAZOLE (FLAGYL) 500 MG Tab Take 1 Tab by mouth 3 times a day for 14 days. 42 Tab 0   • gabapentin (NEURONTIN) 100 MG Cap Take 1-3 tabs at night 60 Cap 3   • losartan (COZAAR) 100 MG Tab Take 1 Tab by mouth every day. 90 Tab 3   • acyclovir (ZOVIRAX) 200 MG Cap Take 2 Caps by mouth 3 times a day as needed. 60 Cap 4   • Probiotic Product (PROBIOTIC DAILY PO) Take  by mouth.     • Multiple Vitamins-Minerals (MULTIPLE VITAMINS/WOMENS PO) Take  by mouth.     • FOLIC ACID PO Take  by mouth.     • BIOTIN PO Take  by mouth.     • coenzyme Q-10 30 MG capsule Take 60 mg by mouth every day.     • Cholecalciferol (VITAMIN D) 2000 UNITS CAPS Take 1 Cap by mouth.     • estradiol (ESTRACE) 1 MG TABS Take 1 mg by mouth every day.       No current facility-administered medications for this visit.        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   • Arthralgia of hip 03/11/2020   • Diverticulosis 03/11/2020   • LLQ abdominal pain 02/05/2020   • Bloody stool 02/05/2020   • Left-sided chest wall pain 02/05/2020   • Skin plaque 09/23/2019   • Herpes simplex infection of skin 09/23/2019   • Hormone replacement therapy (HRT) 09/23/2019   • Anxiety 04/27/2017   • Screening for breast cancer 10/25/2016   • Herpes simplex 10/03/2014   • Hypertension 07/09/2012       Family History   Problem Relation Age of Onset   • Cancer Mother         breast   • Cancer Father         lung   • Hypertension Father    • Diabetes Paternal Uncle    • Cancer Sister 62        colon cancer   • Heart Disease Neg Hx    • Stroke Neg Hx        She  has a past medical history of GERD (gastroesophageal reflux disease) (10/3/2014), Herpes simplex, and Hypertension.  She  has a past surgical history that includes abdominal hysterectomy total (2001); wrist exploration; and lumpectomy (age 19).       Objective:   Vitals obtained by patient:  Weight : 114lbs  Observed respiratory 16/min   HR 65      Physical Exam:  Constitutional: Alert, no distress, well-groomed.  Skin: No rashes in visible areas.  Eye: Round. Conjunctiva clear, lids normal. No icterus.   ENMT: Lips pink without lesions, good dentition, moist mucous membranes. Phonation normal.  Neck: No masses, no thyromegaly. Moves freely without pain.  CV: Pulse as reported by patient  Respiratory: Unlabored respiratory effort, no cough or audible wheeze  Psych: Alert and oriented x3, normal affect and mood.       Assessment and Plan:   The following treatment plan was discussed:     1. LLQ abdominal pain  - ciprofloxacin (CIPRO) 500 MG Tab; Take 1 Tab by mouth 2 times a day for 14 days.  Dispense: 28 Tab; Refill: 0  - metroNIDAZOLE (FLAGYL) 500 MG Tab; Take 1 Tab by mouth 3 times a day for 14 days.  Dispense: 42 Tab; Refill: 0    2. BRBPR  (bright red blood per rectum)    3. Other constipation    Problem List Items Addressed This Visit     LLQ abdominal pain     This is really an acute on chronic issue   She has had a few episodes of LLQ pain associated with constipation and BRBPR  She states her symptoms resolve when she is more active hydrated eating more fiber     We are reassured by her normal pelvic US   She has also consulted with GI and will complete colonoscopy next month     We have placed order for CT scan but she would like to discuss this with GI first    I doubt that she needs abx to cover for diverticulitis at this point as she is not having high fevers, but I do call some in , apparently she has had reports of diverticulosis on colonoscopy but no ct confirmed diverticulitis    Strict ER precautions discussed    Over 25 minutes spent with patient face to face, greater than 50% time spent with plan/coordination of care regarding that which is discussed in the HPI and A&P        Her symptoms have been stable from this stand point              Relevant Medications    ciprofloxacin (CIPRO) 500 MG Tab    metroNIDAZOLE (FLAGYL) 500 MG Tab    BRBPR (bright red blood per rectum)    Other constipation        Alexus Nicole M.D.        Follow-up: No follow-ups on file.

## 2020-07-07 ENCOUNTER — OFFICE VISIT (OUTPATIENT)
Dept: MEDICAL GROUP | Facility: MEDICAL CENTER | Age: 65
End: 2020-07-07
Payer: COMMERCIAL

## 2020-07-07 VITALS
TEMPERATURE: 97.5 F | HEIGHT: 62 IN | DIASTOLIC BLOOD PRESSURE: 86 MMHG | SYSTOLIC BLOOD PRESSURE: 140 MMHG | OXYGEN SATURATION: 98 % | WEIGHT: 112 LBS | BODY MASS INDEX: 20.61 KG/M2 | HEART RATE: 60 BPM | RESPIRATION RATE: 16 BRPM

## 2020-07-07 DIAGNOSIS — M25.559 ARTHRALGIA OF HIP, UNSPECIFIED LATERALITY: ICD-10-CM

## 2020-07-07 DIAGNOSIS — M25.551 HIP PAIN, ACUTE, RIGHT: ICD-10-CM

## 2020-07-07 DIAGNOSIS — I10 ESSENTIAL HYPERTENSION: ICD-10-CM

## 2020-07-07 DIAGNOSIS — K92.1 BLOODY STOOL: ICD-10-CM

## 2020-07-07 DIAGNOSIS — K62.5 BRBPR (BRIGHT RED BLOOD PER RECTUM): ICD-10-CM

## 2020-07-07 PROBLEM — G89.29 HIP PAIN, CHRONIC, RIGHT: Status: ACTIVE | Noted: 2020-07-07

## 2020-07-07 PROCEDURE — 99214 OFFICE O/P EST MOD 30 MIN: CPT | Performed by: FAMILY MEDICINE

## 2020-07-07 PROCEDURE — 99999 PR NO CHARGE: CPT | Performed by: FAMILY MEDICINE

## 2020-07-07 RX ORDER — KETOROLAC TROMETHAMINE 30 MG/ML
60 INJECTION, SOLUTION INTRAMUSCULAR; INTRAVENOUS ONCE
Status: COMPLETED | OUTPATIENT
Start: 2020-07-07 | End: 2020-07-07

## 2020-07-07 RX ORDER — PREDNISONE 20 MG/1
60 TABLET ORAL DAILY
Qty: 15 TAB | Refills: 0 | Status: SHIPPED | OUTPATIENT
Start: 2020-07-07 | End: 2020-07-12

## 2020-07-07 RX ADMIN — KETOROLAC TROMETHAMINE 60 MG: 30 INJECTION, SOLUTION INTRAMUSCULAR; INTRAVENOUS at 11:30

## 2020-07-07 ASSESSMENT — PAIN SCALES - GENERAL: PAINLEVEL: 6=MODERATE PAIN

## 2020-07-07 NOTE — PROGRESS NOTES
This medical record contains text that has been entered with the assistance of computer voice recognition and dictation software.  Therefore, it may contain unintended errors in text, spelling, punctuation, or grammar        Chief Complaint   Patient presents with   • Hip Pain     for about 4-5 days, sore and painful right hip, near hip socket, denies fall or injury, woke pt up sleeping       Praveena Landers is a 65 y.o. female here evaluation and management of:     R hip pain   No injury or inciting event   Lateral   ttp   Aggravated by sitting         Current Outpatient Medications   Medication Sig Dispense Refill   • predniSONE (DELTASONE) 20 MG Tab Take 3 Tabs by mouth every day for 5 days. 15 Tab 0   • losartan (COZAAR) 100 MG Tab Take 1 Tab by mouth every day. 90 Tab 3   • acyclovir (ZOVIRAX) 200 MG Cap Take 2 Caps by mouth 3 times a day as needed. 60 Cap 4   • Probiotic Product (PROBIOTIC DAILY PO) Take  by mouth.     • Multiple Vitamins-Minerals (MULTIPLE VITAMINS/WOMENS PO) Take  by mouth.     • FOLIC ACID PO Take  by mouth.     • BIOTIN PO Take  by mouth.     • Cholecalciferol (VITAMIN D) 2000 UNITS CAPS Take 1 Cap by mouth.     • estradiol (ESTRACE) 1 MG TABS Take 1 mg by mouth every day.       Current Facility-Administered Medications   Medication Dose Route Frequency Provider Last Rate Last Dose   • ketorolac (TORADOL) injection 60 mg  60 mg Intramuscular Once Alexus Nicole M.D.         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   • Arthralgia of hip 03/11/2020   • Diverticulosis 03/11/2020   • LLQ abdominal pain 02/05/2020   • Bloody stool 02/05/2020   • Left-sided chest wall pain 02/05/2020   • Skin plaque 09/23/2019   • Herpes simplex infection of skin 09/23/2019   • Hormone replacement therapy (HRT) 09/23/2019   • Anxiety  "04/27/2017   • Screening for breast cancer 10/25/2016   • Herpes simplex 10/03/2014   • Hypertension 07/09/2012     Past Surgical History:   Procedure Laterality Date   • ABDOMINAL HYSTERECTOMY TOTAL  2001    partial, precancer cervix   • LUMPECTOMY  age 19    R, benign   • WRIST EXPLORATION      Right      Social History     Tobacco Use   • Smoking status: Never Smoker   • Smokeless tobacco: Never Used   Substance Use Topics   • Alcohol use: Yes     Comment: 1gl wine daily   • Drug use: No     Family History   Problem Relation Age of Onset   • Cancer Mother         breast   • Cancer Father         lung   • Hypertension Father    • Diabetes Paternal Uncle    • Cancer Sister 62        colon cancer   • Heart Disease Neg Hx    • Stroke Neg Hx            ROS  No urinary issues  No lower extremity weakness   all review of system completed and negative except for those listed above     Objective:     /86 (BP Location: Left arm, Patient Position: Sitting, BP Cuff Size: Adult)   Pulse 60   Temp 36.4 °C (97.5 °F) (Temporal)   Resp 16   Ht 1.575 m (5' 2\")   Wt 50.8 kg (112 lb)   SpO2 98%  Body mass index is 20.49 kg/m².  Physical Exam:        GEN: comfortable, alert and oriented, well nourished, well developed, in no apparent distress   HEENT: NCAT, eyes: pupils equal and reactive, sclera white, EOMIT, good dentition  HEART: limbs warm and well perfused, regular rate, no JVD, no lower extremity edema  LUNGS: speaking in full sentences, not in apparent respiratory distress, no audible wheezes  MSK: normal tone and bulk, no swelling of the joints, gait steady and normal     ttp lateral hip greater trochanter       Assessment and Plan:   The following treatment plan was discussed        Problem List Items Addressed This Visit     Hypertension     Not well controlled   She is not ready for mediation changes  States this is stress its her busy season at work   2 mo follow up virtual ok for BP check              Bloody " stool    Arthralgia of hip    Relevant Medications    ketorolac (TORADOL) injection 60 mg (Start on 7/7/2020 11:45 AM)    predniSONE (DELTASONE) 20 MG Tab    BRBPR (bright red blood per rectum)     Colonoscopy -->diverticulosis only              Hip pain, acute, right     Seems most consistent with bursitis   Probably aggravated by busy/stressful season at work sitting more   NSAID (see orders)   Prednisone   Ice   Stretch   Home pt program x 2 weeks   1-2 week virtual follow up if still having issues we can involve PT MS[facility] etc    Over 25 minutes spent with patient face to face, greater than 50% time spent with plan/coordination of care regarding that which is discussed in the HPI and A&P                       Instructed to follow up if symptoms worsen or fail to improve, ER/UC precautions discussed as well    Alexus Nicole MD  [facility], Family Medicine   [address] [place], [place] [address]y   [place] [address]  Phone: [phone]

## 2020-07-07 NOTE — ASSESSMENT & PLAN NOTE
Patient has an appointment on Wednesday 3/15/17 in [place] [facility] [place] with Dr. Montgomery, rheumatology.  Can you please enter a current order/referral for patient's appointment as part of insurance requirement.  Thank you.     Seems most consistent with bursitis   Probably aggravated by busy/stressful season at work sitting more   NSAID (see orders)   Prednisone   Ice   Stretch   Home pt program x 2 weeks   1-2 week virtual follow up if still having issues we can involve PT MSK etc    Over 25 minutes spent with patient face to face, greater than 50% time spent with plan/coordination of care regarding that which is discussed in the HPI and A&P

## 2020-07-07 NOTE — ASSESSMENT & PLAN NOTE
Not well controlled   She is not ready for mediation changes  States this is stress its her busy season at work   2 mo follow up virtual ok for BP check

## 2021-02-11 DIAGNOSIS — B00.9 HERPES SIMPLEX INFECTION OF SKIN: ICD-10-CM

## 2021-02-12 RX ORDER — ACYCLOVIR 200 MG/1
400 CAPSULE ORAL 3 TIMES DAILY PRN
Qty: 60 CAPSULE | Refills: 0 | Status: SHIPPED | OUTPATIENT
Start: 2021-02-12 | End: 2021-07-09 | Stop reason: SDUPTHER

## 2021-03-05 DIAGNOSIS — I10 ESSENTIAL HYPERTENSION: ICD-10-CM

## 2021-03-05 RX ORDER — LOSARTAN POTASSIUM 100 MG/1
TABLET ORAL
Qty: 30 TABLET | Refills: 0 | Status: SHIPPED | OUTPATIENT
Start: 2021-03-05 | End: 2021-03-08 | Stop reason: SDUPTHER

## 2021-03-08 DIAGNOSIS — I10 ESSENTIAL HYPERTENSION: ICD-10-CM

## 2021-03-08 RX ORDER — LOSARTAN POTASSIUM 100 MG/1
100 TABLET ORAL
Qty: 100 TABLET | Refills: 0 | Status: SHIPPED | OUTPATIENT
Start: 2021-03-08 | End: 2021-03-29

## 2021-03-15 DIAGNOSIS — Z23 NEED FOR VACCINATION: ICD-10-CM

## 2021-03-29 DIAGNOSIS — I10 ESSENTIAL HYPERTENSION: ICD-10-CM

## 2021-03-29 RX ORDER — LOSARTAN POTASSIUM 100 MG/1
100 TABLET ORAL
Qty: 30 TABLET | Refills: 0 | Status: SHIPPED | OUTPATIENT
Start: 2021-03-29 | End: 2021-06-29

## 2021-05-10 ENCOUNTER — PATIENT OUTREACH (OUTPATIENT)
Dept: HEALTH INFORMATION MANAGEMENT | Facility: OTHER | Age: 66
End: 2021-05-10

## 2021-05-10 NOTE — PROGRESS NOTES
Outcome: Left Message to schedule  Comprehensive Health  Assessment    Please transfer to Patient Outreach Team at [phone] when patient returns call.        Attempt #1

## 2021-06-27 DIAGNOSIS — I10 ESSENTIAL HYPERTENSION: ICD-10-CM

## 2021-06-29 RX ORDER — LOSARTAN POTASSIUM 100 MG/1
100 TABLET ORAL
Qty: 100 TABLET | Refills: 0 | Status: SHIPPED | OUTPATIENT
Start: 2021-06-29 | End: 2021-07-13

## 2021-07-09 DIAGNOSIS — B00.9 HERPES SIMPLEX INFECTION OF SKIN: ICD-10-CM

## 2021-07-12 DIAGNOSIS — I10 ESSENTIAL HYPERTENSION: ICD-10-CM

## 2021-07-12 DIAGNOSIS — B00.9 HERPES SIMPLEX INFECTION OF SKIN: ICD-10-CM

## 2021-07-13 ENCOUNTER — PATIENT OUTREACH (OUTPATIENT)
Dept: HEALTH INFORMATION MANAGEMENT | Facility: OTHER | Age: 66
End: 2021-07-13

## 2021-07-13 RX ORDER — ACYCLOVIR 200 MG/1
400 CAPSULE ORAL 3 TIMES DAILY PRN
Qty: 60 CAPSULE | Refills: 0 | Status: SHIPPED | OUTPATIENT
Start: 2021-07-13

## 2021-07-13 RX ORDER — LOSARTAN POTASSIUM 100 MG/1
100 TABLET ORAL
Qty: 100 TABLET | Refills: 0 | Status: SHIPPED | OUTPATIENT
Start: 2021-07-13 | End: 2021-12-23

## 2021-07-13 RX ORDER — ACYCLOVIR 200 MG/1
400 CAPSULE ORAL 3 TIMES DAILY PRN
Qty: 60 CAPSULE | Refills: 0 | Status: SHIPPED | OUTPATIENT
Start: 2021-07-13 | End: 2022-05-26

## 2021-07-13 SDOH — ECONOMIC STABILITY: INCOME INSECURITY: IN THE LAST 12 MONTHS, WAS THERE A TIME WHEN YOU WERE NOT ABLE TO PAY THE MORTGAGE OR RENT ON TIME?: NO

## 2021-07-13 SDOH — HEALTH STABILITY: PHYSICAL HEALTH: ON AVERAGE, HOW MANY MINUTES DO YOU ENGAGE IN EXERCISE AT THIS LEVEL?: 30 MIN

## 2021-07-13 SDOH — ECONOMIC STABILITY: HOUSING INSECURITY
IN THE LAST 12 MONTHS, WAS THERE A TIME WHEN YOU DID NOT HAVE A STEADY PLACE TO SLEEP OR SLEPT IN A SHELTER (INCLUDING NOW)?: NO

## 2021-07-13 SDOH — ECONOMIC STABILITY: TRANSPORTATION INSECURITY
IN THE PAST 12 MONTHS, HAS THE LACK OF TRANSPORTATION KEPT YOU FROM MEDICAL APPOINTMENTS OR FROM GETTING MEDICATIONS?: NO

## 2021-07-13 SDOH — ECONOMIC STABILITY: FOOD INSECURITY: WITHIN THE PAST 12 MONTHS, YOU WORRIED THAT YOUR FOOD WOULD RUN OUT BEFORE YOU GOT MONEY TO BUY MORE.: NEVER TRUE

## 2021-07-13 SDOH — ECONOMIC STABILITY: INCOME INSECURITY: HOW HARD IS IT FOR YOU TO PAY FOR THE VERY BASICS LIKE FOOD, HOUSING, MEDICAL CARE, AND HEATING?: NOT VERY HARD

## 2021-07-13 SDOH — ECONOMIC STABILITY: HOUSING INSECURITY: IN THE LAST 12 MONTHS, HOW MANY PLACES HAVE YOU LIVED?: 1

## 2021-07-13 SDOH — ECONOMIC STABILITY: TRANSPORTATION INSECURITY
IN THE PAST 12 MONTHS, HAS LACK OF TRANSPORTATION KEPT YOU FROM MEETINGS, WORK, OR FROM GETTING THINGS NEEDED FOR DAILY LIVING?: NO

## 2021-07-13 SDOH — HEALTH STABILITY: PHYSICAL HEALTH: ON AVERAGE, HOW MANY DAYS PER WEEK DO YOU ENGAGE IN MODERATE TO STRENUOUS EXERCISE (LIKE A BRISK WALK)?: 6 DAYS

## 2021-07-13 SDOH — ECONOMIC STABILITY: TRANSPORTATION INSECURITY
IN THE PAST 12 MONTHS, HAS LACK OF RELIABLE TRANSPORTATION KEPT YOU FROM MEDICAL APPOINTMENTS, MEETINGS, WORK OR FROM GETTING THINGS NEEDED FOR DAILY LIVING?: NO

## 2021-07-13 SDOH — HEALTH STABILITY: MENTAL HEALTH
STRESS IS WHEN SOMEONE FEELS TENSE, NERVOUS, ANXIOUS, OR CAN'T SLEEP AT NIGHT BECAUSE THEIR MIND IS TROUBLED. HOW STRESSED ARE YOU?: VERY MUCH

## 2021-07-13 SDOH — ECONOMIC STABILITY: FOOD INSECURITY: WITHIN THE PAST 12 MONTHS, THE FOOD YOU BOUGHT JUST DIDN'T LAST AND YOU DIDN'T HAVE MONEY TO GET MORE.: NEVER TRUE

## 2021-07-13 ASSESSMENT — SOCIAL DETERMINANTS OF HEALTH (SDOH)
WITHIN THE PAST 12 MONTHS, YOU WORRIED THAT YOUR FOOD WOULD RUN OUT BEFORE YOU GOT THE MONEY TO BUY MORE: NEVER TRUE
HOW HARD IS IT FOR YOU TO PAY FOR THE VERY BASICS LIKE FOOD, HOUSING, MEDICAL CARE, AND HEATING?: NOT VERY HARD
HOW OFTEN DO YOU ATTEND CHURCH OR RELIGIOUS SERVICES?: 1 TO 4 TIMES PER YEAR
HOW OFTEN DO YOU GET TOGETHER WITH FRIENDS OR RELATIVES?: MORE THAN THREE TIMES A WEEK
DO YOU BELONG TO ANY CLUBS OR ORGANIZATIONS SUCH AS CHURCH GROUPS UNIONS, FRATERNAL OR ATHLETIC GROUPS, OR SCHOOL GROUPS?: NO
IN A TYPICAL WEEK, HOW MANY TIMES DO YOU TALK ON THE PHONE WITH FAMILY, FRIENDS, OR NEIGHBORS?: MORE THAN THREE TIMES A WEEK
DO YOU BELONG TO ANY CLUBS OR ORGANIZATIONS SUCH AS CHURCH GROUPS UNIONS, FRATERNAL OR ATHLETIC GROUPS, OR SCHOOL GROUPS?: NO
HOW OFTEN DO YOU ATTEND CHURCH OR RELIGIOUS SERVICES?: 1 TO 4 TIMES PER YEAR
HOW OFTEN DO YOU HAVE A DRINK CONTAINING ALCOHOL: 2-3 TIMES A WEEK
IN A TYPICAL WEEK, HOW MANY TIMES DO YOU TALK ON THE PHONE WITH FAMILY, FRIENDS, OR NEIGHBORS?: MORE THAN THREE TIMES A WEEK
HOW OFTEN DO YOU GET TOGETHER WITH FRIENDS OR RELATIVES?: MORE THAN THREE TIMES A WEEK
HOW OFTEN DO YOU ATTENT MEETINGS OF THE CLUB OR ORGANIZATION YOU BELONG TO?: 1 TO 4 TIMES PER YEAR
HOW OFTEN DO YOU HAVE SIX OR MORE DRINKS ON ONE OCCASION: NEVER
HOW MANY DRINKS CONTAINING ALCOHOL DO YOU HAVE ON A TYPICAL DAY WHEN YOU ARE DRINKING: 1 OR 2
HOW OFTEN DO YOU ATTENT MEETINGS OF THE CLUB OR ORGANIZATION YOU BELONG TO?: 1 TO 4 TIMES PER YEAR

## 2021-07-13 ASSESSMENT — LIFESTYLE VARIABLES
HOW OFTEN DO YOU HAVE A DRINK CONTAINING ALCOHOL: 2-3 TIMES A WEEK
HOW MANY STANDARD DRINKS CONTAINING ALCOHOL DO YOU HAVE ON A TYPICAL DAY: 1 OR 2
HOW OFTEN DO YOU HAVE SIX OR MORE DRINKS ON ONE OCCASION: NEVER

## 2021-07-13 NOTE — NON-PROVIDER
hipaa verified, member called regarding PCP letter. Advised since member has upcoming appointment should be okay as she probably received the letter because of this. Also updated members PCP to Marina Nicole per Wi as well. Confirmed appointment per epic.   used epic and pq

## 2021-07-14 ENCOUNTER — OFFICE VISIT (OUTPATIENT)
Dept: MEDICAL GROUP | Facility: MEDICAL CENTER | Age: 66
End: 2021-07-14
Payer: MEDICARE

## 2021-07-14 ENCOUNTER — HOSPITAL ENCOUNTER (OUTPATIENT)
Facility: MEDICAL CENTER | Age: 66
End: 2021-07-14
Attending: FAMILY MEDICINE
Payer: MEDICARE

## 2021-07-14 VITALS
OXYGEN SATURATION: 98 % | WEIGHT: 112 LBS | HEIGHT: 62 IN | SYSTOLIC BLOOD PRESSURE: 122 MMHG | HEART RATE: 60 BPM | TEMPERATURE: 98.1 F | BODY MASS INDEX: 20.61 KG/M2 | RESPIRATION RATE: 16 BRPM | DIASTOLIC BLOOD PRESSURE: 70 MMHG

## 2021-07-14 DIAGNOSIS — Z00.00 MEDICARE ANNUAL WELLNESS VISIT, SUBSEQUENT: ICD-10-CM

## 2021-07-14 DIAGNOSIS — Z11.59 NEED FOR HEPATITIS C SCREENING TEST: ICD-10-CM

## 2021-07-14 DIAGNOSIS — N95.9 MENOPAUSAL AND POSTMENOPAUSAL DISORDER: ICD-10-CM

## 2021-07-14 DIAGNOSIS — Z12.31 ENCOUNTER FOR SCREENING MAMMOGRAM FOR BREAST CANCER: ICD-10-CM

## 2021-07-14 DIAGNOSIS — Z13.1 SCREENING FOR DIABETES MELLITUS (DM): ICD-10-CM

## 2021-07-14 DIAGNOSIS — B00.9 HSV (HERPES SIMPLEX VIRUS) INFECTION: ICD-10-CM

## 2021-07-14 DIAGNOSIS — B02.9 HERPES ZOSTER WITHOUT COMPLICATION: ICD-10-CM

## 2021-07-14 DIAGNOSIS — Z13.6 SCREENING FOR ISCHEMIC HEART DISEASE: ICD-10-CM

## 2021-07-14 DIAGNOSIS — B00.9 HERPES SIMPLEX: ICD-10-CM

## 2021-07-14 LAB — AMBIGUOUS DTTM AMBI4: NORMAL

## 2021-07-14 PROCEDURE — G0439 PPPS, SUBSEQ VISIT: HCPCS | Performed by: FAMILY MEDICINE

## 2021-07-14 PROCEDURE — 87529 HSV DNA AMP PROBE: CPT

## 2021-07-14 RX ORDER — GABAPENTIN 100 MG/1
100 CAPSULE ORAL 3 TIMES DAILY
Qty: 90 CAPSULE | Refills: 3 | Status: SHIPPED | OUTPATIENT
Start: 2021-07-14 | End: 2022-05-26

## 2021-07-14 RX ORDER — VALACYCLOVIR HYDROCHLORIDE 1 G/1
1000 TABLET, FILM COATED ORAL DAILY
Qty: 90 TABLET | Refills: 3 | Status: SHIPPED | OUTPATIENT
Start: 2021-07-14 | End: 2022-07-05

## 2021-07-14 ASSESSMENT — ACTIVITIES OF DAILY LIVING (ADL): BATHING_REQUIRES_ASSISTANCE: 0

## 2021-07-14 ASSESSMENT — PATIENT HEALTH QUESTIONNAIRE - PHQ9: CLINICAL INTERPRETATION OF PHQ2 SCORE: 0

## 2021-07-14 ASSESSMENT — ENCOUNTER SYMPTOMS: GENERAL WELL-BEING: GOOD

## 2021-07-14 NOTE — ASSESSMENT & PLAN NOTE
Recurrent blisters buttock area   Started when she was pregnant   Culture obtained today however very early  Consider HSV   Consider antiviral ppx therapy   Script sent in

## 2021-07-14 NOTE — PROGRESS NOTES
Chief Complaint   Patient presents with   • Annual Wellness Visit         HPI:  Praveena Mitchell is a 66 y.o. here for Medicare Annual Wellness Visit  She would also like to talk about a chronic issue that is new to me   'I get cold sores on my low back recurrently, more so during times of stress'    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   • Arthralgia of hip 03/11/2020   • Diverticulosis 03/11/2020   • LLQ abdominal pain 02/05/2020   • Bloody stool 02/05/2020   • Left-sided chest wall pain 02/05/2020   • Skin plaque 09/23/2019   • Herpes simplex infection of skin 09/23/2019   • Hormone replacement therapy (HRT) 09/23/2019   • Anxiety 04/27/2017   • Screening for breast cancer 10/25/2016   • Herpes simplex 10/03/2014   • Hypertension 07/09/2012       Current Outpatient Medications   Medication Sig Dispense Refill   • acyclovir (ZOVIRAX) 200 MG Cap Take 2 Capsules by mouth 3 times a day as needed. 60 capsule 0   • acyclovir (ZOVIRAX) 200 MG Cap TAKE 2 CAPS BY MOUTH 3 TIMES A DAY AS NEEDED. 60 capsule 0   • losartan (COZAAR) 100 MG Tab TAKE 1 TABLET BY MOUTH EVERY  tablet 0   • Probiotic Product (PROBIOTIC DAILY PO) Take  by mouth.     • Multiple Vitamins-Minerals (MULTIPLE VITAMINS/WOMENS PO) Take  by mouth.     • FOLIC ACID PO Take  by mouth.     • BIOTIN PO Take  by mouth.     • Cholecalciferol (VITAMIN D) 2000 UNITS CAPS Take 1 Cap by mouth.     • estradiol (ESTRACE) 1 MG TABS Take 1 mg by mouth every day. Takes 1/2 tab every other day       No current facility-administered medications for this visit.        Patient is taking medications as noted in medication list.  Current supplements as per medication list.     Allergies: Amlodipine, Benazepril, and Penicillins    Current social contact/activities: yes, neighborhood activities     Is  patient current with immunizations? No, due for PNEUMOVAX (PPSV23), TDAP and SHINGRIX (Shingles). Patient is interested in receiving NONE today.    She  reports that she has never smoked. She has never used smokeless tobacco. She reports current alcohol use. She reports that she does not use drugs.  Counseling given: Not Answered        DPA/Advanced directive: Patient has Advanced Directive, but it is not on file. Instructed to bring in a copy to scan into their chart.    ROS:    Gait: Uses no assistive device   Ostomy: No   Other tubes: No   Amputations: No   Chronic oxygen use No   Last eye exam 7/2020   Wears hearing aids: No   : Denies any urinary leakage during the last 6 months      Depression Screening    Little interest or pleasure in doing things?  0 - not at all  Feeling down, depressed, or hopeless? 0 - not at all  Patient Health Questionnaire Score: 0    If depressive symptoms identified deferred to follow up visit unless specifically addressed in assessment and plan.    Interpretation of PHQ-9 Total Score   Score Severity   1-4 No Depression   5-9 Mild Depression   10-14 Moderate Depression   15-19 Moderately Severe Depression   20-27 Severe Depression    Screening for Cognitive Impairment    Three Minute Recall (captain, garden, picture)  2/3    Bill clock face with all 12 numbers and set the hands to show 5 past 8.  Yes    If cognitive concerns identified, deferred for follow up unless specifically addressed in assessment and plan.    Fall Risk Assessment    Has the patient had two or more falls in the last year or any fall with injury in the last year?  Yes  If fall risk identified, deferred for follow up unless specifically addressed in assessment and plan.    Safety Assessment    Throw rugs on floor.  Yes  Handrails on all stairs.  Yes  Good lighting in all hallways.  Yes  Difficulty hearing.  No  Patient counseled about all safety risks that were identified.    Functional Assessment ADLs    Are  there any barriers preventing you from cooking for yourself or meeting nutritional needs?  No.    Are there any barriers preventing you from driving safely or obtaining transportation?  No.    Are there any barriers preventing you from using a telephone or calling for help?  No.    Are there any barriers preventing you from shopping?  No.    Are there any barriers preventing you from taking care of your own finances?  No.    Are there any barriers preventing you from managing your medications?  No.    Are there any barriers preventing you from showering, bathing or dressing yourself?  No.    Are you currently engaging in any exercise or physical activity?  Yes.  Walking puppy every day  What is your perception of your health?  Good.    Health Maintenance Summary                HEPATITIS C SCREENING Overdue 1955     IMM DTaP/Tdap/Td Vaccine Overdue 3/31/1975     MAMMOGRAM Overdue 10/10/2020      Done 10/10/2019 MA-SCREENING MAMMO BILAT W/TOMOSYNTHESIS W/CAD     Patient has more history with this topic...    IMM ZOSTER VACCINES Overdue 12/14/2020      Done 10/19/2020 Imm Admin: Zoster Vaccine Recombinant (RZV) (SHINGRIX)    BONE DENSITY Overdue 3/31/2021      Done 5/26/2009 DS-BONE DENSITY STUDY (DEXA)    IMM PNEUMOCOCCAL VACCINE: 65+ Years Overdue 3/31/2021     IMM INFLUENZA Next Due 9/1/2021      Done 10/19/2020 Imm Admin: Influenza Vaccine Quad Inj (Pf)     Patient has more history with this topic...    COLONOSCOPY Next Due 4/22/2025      Done 4/22/2020 REFERRAL TO GI FOR COLONOSCOPY     Patient has more history with this topic...          Patient Care Team:  Alexus Nicole M.D. as PCP - General (Family Medicine)  Vladislav Casanova M.D. as Consulting Physician (OB/Gyn)    Social History     Tobacco Use   • Smoking status: Never Smoker   • Smokeless tobacco: Never Used   Vaping Use   • Vaping Use: Never used   Substance Use Topics   • Alcohol use: Yes     Comment: 1gl wine daily   • Drug use: No     Family  "History   Problem Relation Age of Onset   • Cancer Mother         breast   • Cancer Father         lung   • Hypertension Father    • Diabetes Paternal Uncle    • Cancer Sister 62        colon cancer   • Heart Disease Neg Hx    • Stroke Neg Hx      She  has a past medical history of GERD (gastroesophageal reflux disease) (10/3/2014), Herpes simplex, and Hypertension.   Past Surgical History:   Procedure Laterality Date   • ABDOMINAL HYSTERECTOMY TOTAL  2001    partial, precancer cervix   • LUMPECTOMY  age 19    R, benign   • WRIST EXPLORATION      Right           Exam:     /70 (BP Location: Left arm, Patient Position: Sitting, BP Cuff Size: Adult)   Pulse 60   Temp 36.7 °C (98.1 °F) (Temporal)   Resp 16   Ht 1.575 m (5' 2\")   Wt 50.8 kg (112 lb)   SpO2 98%  Body mass index is 20.49 kg/m².    Hearing excellent.    Dentition good  Alert, oriented in no acute distress.  Eye contact is good, speech goal directed, affect calm      Assessment and Plan. The following treatment and monitoring plan is recommended:    No diagnosis found.  Problem List Items Addressed This Visit     Herpes simplex     Recurrent blisters buttock area   Started when she was pregnant   Culture obtained today however very early  Consider HSV   Consider antiviral ppx therapy   Script sent in              Medicare annual wellness visit, subsequent     Age appropriate prev health care discussed                Other Visit Diagnoses     Herpes zoster without complication        Relevant Medications    valacyclovir (VALTREX) 1 GM Tab    gabapentin (NEURONTIN) 100 MG Cap    Encounter for screening mammogram for breast cancer        Relevant Orders    MA-SCREENING MAMMO BILAT W/CAD    Need for hepatitis C screening test        Relevant Orders    HEP C VIRUS ANTIBODY    HSV (herpes simplex virus) infection        Relevant Orders    Viral Culture, Rapid, Lesion    Screening for diabetes mellitus (DM)        Screening for ischemic heart disease    "     Relevant Orders    Lipid Profile    Basic Metabolic Panel    Menopausal and postmenopausal disorder        Relevant Orders    DS-BONE DENSITY STUDY (DEXA)            Services suggested: No services needed at this time  Health Care Screening recommendations as per orders if indicated.  Referrals offered: PT/OT/Nutrition counseling/Behavioral Health/Smoking cessation as per orders if indicated.    Discussion today about general wellness and lifestyle habits:    · Prevent falls and reduce trip hazards; Cautioned about securing or removing rugs.  · Have a working fire alarm and carbon monoxide detector;   · Engage in regular physical activity and social activities       Follow-up: No follow-ups on file.

## 2021-07-16 LAB
HSV1 DNA SPEC QL NAA+PROBE: NEGATIVE
HSV2 DNA SPEC QL NAA+PROBE: POSITIVE
SPECIMEN SOURCE: ABNORMAL

## 2021-07-20 ENCOUNTER — TELEPHONE (OUTPATIENT)
Dept: MEDICAL GROUP | Facility: MEDICAL CENTER | Age: 66
End: 2021-07-20

## 2021-07-20 NOTE — TELEPHONE ENCOUNTER
"----- Message from Alexus Nicole M.D. sent at 7/20/2021  4:40 PM PDT -----  Good news we were able to culture her rash and get her an answer after all these years!   As we suspected in clinic this is HSV or herpes, or as Praveena described it \"cold sores on her lower back\"  "

## 2021-07-21 RX ORDER — CLOBETASOL PROPIONATE 0.5 MG/G
CREAM TOPICAL BID
Qty: 1 | Refills: 1 | Status: ERX

## 2021-11-30 NOTE — PROGRESS NOTES
Outcome: Left Message    Please transfer to Patient Outreach Team at [phone] when patient returns call.      Attempt # 2

## 2021-12-23 DIAGNOSIS — I10 ESSENTIAL HYPERTENSION: ICD-10-CM

## 2021-12-23 RX ORDER — LOSARTAN POTASSIUM 100 MG/1
100 TABLET ORAL
Qty: 100 TABLET | Refills: 0 | Status: SHIPPED | OUTPATIENT
Start: 2021-12-23 | End: 2022-04-05

## 2022-04-03 DIAGNOSIS — I10 ESSENTIAL HYPERTENSION: ICD-10-CM

## 2022-04-05 RX ORDER — LOSARTAN POTASSIUM 100 MG/1
100 TABLET ORAL
Qty: 100 TABLET | Refills: 0 | Status: SHIPPED | OUTPATIENT
Start: 2022-04-05 | End: 2022-11-29

## 2022-04-05 NOTE — TELEPHONE ENCOUNTER
Future Appointments       Provider Department Center    4/21/2022 3:00 PM Alexus Nicole M.D. [facility]

## 2022-04-21 ENCOUNTER — OFFICE VISIT (OUTPATIENT)
Dept: MEDICAL GROUP | Facility: MEDICAL CENTER | Age: 67
End: 2022-04-21
Payer: MEDICARE

## 2022-04-21 VITALS
BODY MASS INDEX: 20.43 KG/M2 | DIASTOLIC BLOOD PRESSURE: 90 MMHG | HEIGHT: 62 IN | HEART RATE: 62 BPM | TEMPERATURE: 97.5 F | WEIGHT: 111 LBS | RESPIRATION RATE: 16 BRPM | OXYGEN SATURATION: 98 % | SYSTOLIC BLOOD PRESSURE: 140 MMHG

## 2022-04-21 DIAGNOSIS — Z12.31 ENCOUNTER FOR SCREENING MAMMOGRAM FOR BREAST CANCER: ICD-10-CM

## 2022-04-21 DIAGNOSIS — N95.9 MENOPAUSAL AND POSTMENOPAUSAL DISORDER: ICD-10-CM

## 2022-04-21 DIAGNOSIS — H92.01 RIGHT EAR PAIN: ICD-10-CM

## 2022-04-21 DIAGNOSIS — I10 HYPERTENSION, UNSPECIFIED TYPE: ICD-10-CM

## 2022-04-21 PROCEDURE — 99213 OFFICE O/P EST LOW 20 MIN: CPT | Performed by: FAMILY MEDICINE

## 2022-04-21 RX ORDER — HYDROCHLOROTHIAZIDE 12.5 MG/1
12.5 TABLET ORAL DAILY
Qty: 90 TABLET | Refills: 3 | Status: SHIPPED | OUTPATIENT
Start: 2022-04-21 | End: 2022-07-20

## 2022-04-21 ASSESSMENT — PATIENT HEALTH QUESTIONNAIRE - PHQ9: CLINICAL INTERPRETATION OF PHQ2 SCORE: 0

## 2022-04-21 NOTE — ASSESSMENT & PLAN NOTE
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   But did have 66% less funding this past year

## 2022-04-21 NOTE — PROGRESS NOTES
This medical record contains text that has been entered with the assistance of computer voice recognition and dictation software.  Therefore, it may contain unintended errors in text, spelling, punctuation, or grammar        Chief Complaint   Patient presents with   • Hypertension Follow-up     Blood pressure follow up.   • Other     Pain on right side of head involving lateral, posterior neck. Recovered recently from cold and ear infection. Intermittent pain   • Immunizations     Pt interested in vaccines today - will be getting shingles #2 at pharmacy       Praveena Landers is a 67 y.o. female here evaluation and management of:       Current Outpatient Medications   Medication Sig Dispense Refill   • hydroCHLOROthiazide (HYDRODIURIL) 12.5 MG tablet Take 1 Tablet by mouth every day for 90 days. 90 Tablet 3   • losartan (COZAAR) 100 MG Tab TAKE 1 TABLET BY MOUTH EVERY  Tablet 0   • valacyclovir (VALTREX) 1 GM Tab Take 1 tablet by mouth every day. 90 tablet 3   • gabapentin (NEURONTIN) 100 MG Cap Take 1 capsule by mouth 3 times a day. 90 capsule 3   • acyclovir (ZOVIRAX) 200 MG Cap Take 2 Capsules by mouth 3 times a day as needed. 60 capsule 0   • acyclovir (ZOVIRAX) 200 MG Cap TAKE 2 CAPS BY MOUTH 3 TIMES A DAY AS NEEDED. 60 capsule 0   • Probiotic Product (PROBIOTIC DAILY PO) Take  by mouth.     • Multiple Vitamins-Minerals (MULTIPLE VITAMINS/WOMENS PO) Take  by mouth.     • FOLIC ACID PO Take  by mouth.     • BIOTIN PO Take  by mouth.     • Cholecalciferol (VITAMIN D) 2000 UNITS CAPS Take 1 Cap by mouth.     • estradiol (ESTRACE) 1 MG TABS Take 1 mg by mouth every day. Takes 1/2 tab every other day       No current facility-administered medications for this visit.     Patient Active Problem List    Diagnosis Date Noted   • Right ear pain 04/21/2022   • Medicare annual wellness visit, subsequent 07/14/2021   • Hip pain, acute, right 07/07/2020   • BRBPR (bright red blood per rectum) 03/31/2020   • Other  "constipation 03/31/2020   • Intercostal neuritis 03/11/2020   • Arthralgia of hip 03/11/2020   • Diverticulosis 03/11/2020   • LLQ abdominal pain 02/05/2020   • Bloody stool 02/05/2020   • Left-sided chest wall pain 02/05/2020   • Skin plaque 09/23/2019   • Herpes simplex infection of skin 09/23/2019   • Hormone replacement therapy (HRT) 09/23/2019   • Anxiety 04/27/2017   • Screening for breast cancer 10/25/2016   • Herpes simplex 10/03/2014   • Hypertension 07/09/2012     Past Surgical History:   Procedure Laterality Date   • ABDOMINAL HYSTERECTOMY TOTAL  2001    partial, precancer cervix   • LUMPECTOMY  age 19    R, benign   • WRIST EXPLORATION      Right      Social History     Tobacco Use   • Smoking status: Never Smoker   • Smokeless tobacco: Never Used   Vaping Use   • Vaping Use: Never used   Substance Use Topics   • Alcohol use: Yes     Comment: 1gl wine daily   • Drug use: No     Family History   Problem Relation Age of Onset   • Cancer Mother         breast   • Cancer Father         lung   • Hypertension Father    • Diabetes Paternal Uncle    • Cancer Sister 62        colon cancer   • Heart Disease Neg Hx    • Stroke Neg Hx            ROS    all review of system completed and negative except for those listed above     Objective:     /90 (BP Location: Right arm, Patient Position: Sitting, BP Cuff Size: Adult)   Pulse 62   Temp 36.4 °C (97.5 °F) (Temporal)   Resp 16   Ht 1.575 m (5' 2\")   Wt 50.3 kg (111 lb)   SpO2 98%  Body mass index is 20.3 kg/m².  Physical Exam:        GEN: comfortable, alert and oriented, well nourished, well developed, in no apparent distress   HEENT: NCAT, eyes: pupils equal and reactive, sclera white, EOMIT, good dentition  HEART: limbs warm and well perfused, regular rate, no JVD, no lower extremity edema  LUNGS: speaking in full sentences, not in apparent respiratory distress, no audible wheezes  MSK: normal tone and bulk, no swelling of the joints, gait steady and " normal         Assessment and Plan:   The following treatment plan was discussed        Problem List Items Addressed This Visit     Hypertension    Relevant Medications    hydroCHLOROthiazide (HYDRODIURIL) 12.5 MG tablet    Other Relevant Orders    Basic Metabolic Panel    Lipid Profile    MICROALBUMIN CREAT RATIO URINE    Right ear pain     No infection on exam   Retraction 2/2 allergies likely Eustatian tube dysfunction   Consider TMJ from grinding  Clenching     She has had a very stressful year   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   But did have 66% less funding this past year                Other Visit Diagnoses     Encounter for screening mammogram for breast cancer        Relevant Orders    MA-SCREENING MAMMO BILAT W/CAD    Menopausal and postmenopausal disorder        Relevant Orders    DS-BONE DENSITY STUDY (DEXA)                Instructed to follow up if symptoms worsen or fail to improve, ER/UC precautions discussed as well    Alexus Nicole MD  [facility], Family Medicine   [address] [place] [address]   [place] [address]  Phone: [phone]

## 2022-05-06 ENCOUNTER — HOSPITAL ENCOUNTER (OUTPATIENT)
Dept: RADIOLOGY | Facility: MEDICAL CENTER | Age: 67
End: 2022-05-06
Attending: FAMILY MEDICINE
Payer: MEDICARE

## 2022-05-06 DIAGNOSIS — N95.9 MENOPAUSAL AND POSTMENOPAUSAL DISORDER: ICD-10-CM

## 2022-05-06 PROCEDURE — 77080 DXA BONE DENSITY AXIAL: CPT

## 2022-05-12 ENCOUNTER — HOSPITAL ENCOUNTER (OUTPATIENT)
Dept: RADIOLOGY | Facility: MEDICAL CENTER | Age: 67
End: 2022-05-12
Attending: FAMILY MEDICINE
Payer: MEDICARE

## 2022-05-12 DIAGNOSIS — Z12.31 VISIT FOR SCREENING MAMMOGRAM: ICD-10-CM

## 2022-05-12 PROCEDURE — 77063 BREAST TOMOSYNTHESIS BI: CPT

## 2022-05-23 ENCOUNTER — OFFICE VISIT (OUTPATIENT)
Dept: URGENT CARE | Facility: CLINIC | Age: 67
End: 2022-05-23
Payer: MEDICARE

## 2022-05-23 ENCOUNTER — HOSPITAL ENCOUNTER (OUTPATIENT)
Facility: MEDICAL CENTER | Age: 67
End: 2022-05-23
Attending: STUDENT IN AN ORGANIZED HEALTH CARE EDUCATION/TRAINING PROGRAM
Payer: MEDICARE

## 2022-05-23 VITALS
TEMPERATURE: 98.9 F | HEART RATE: 86 BPM | BODY MASS INDEX: 20.24 KG/M2 | WEIGHT: 110 LBS | OXYGEN SATURATION: 94 % | SYSTOLIC BLOOD PRESSURE: 142 MMHG | HEIGHT: 62 IN | RESPIRATION RATE: 18 BRPM | DIASTOLIC BLOOD PRESSURE: 80 MMHG

## 2022-05-23 DIAGNOSIS — J06.9 VIRAL URI WITH COUGH: ICD-10-CM

## 2022-05-23 DIAGNOSIS — Z20.822 COVID-19 RULED OUT: ICD-10-CM

## 2022-05-23 LAB
INT CON NEG: NORMAL
INT CON POS: NORMAL
S PYO AG THROAT QL: NEGATIVE

## 2022-05-23 PROCEDURE — 99213 OFFICE O/P EST LOW 20 MIN: CPT | Mod: CS | Performed by: STUDENT IN AN ORGANIZED HEALTH CARE EDUCATION/TRAINING PROGRAM

## 2022-05-23 PROCEDURE — 87880 STREP A ASSAY W/OPTIC: CPT | Performed by: STUDENT IN AN ORGANIZED HEALTH CARE EDUCATION/TRAINING PROGRAM

## 2022-05-23 PROCEDURE — U0005 INFEC AGEN DETEC AMPLI PROBE: HCPCS

## 2022-05-23 PROCEDURE — U0003 INFECTIOUS AGENT DETECTION BY NUCLEIC ACID (DNA OR RNA); SEVERE ACUTE RESPIRATORY SYNDROME CORONAVIRUS 2 (SARS-COV-2) (CORONAVIRUS DISEASE [COVID-19]), AMPLIFIED PROBE TECHNIQUE, MAKING USE OF HIGH THROUGHPUT TECHNOLOGIES AS DESCRIBED BY CMS-2020-01-R: HCPCS

## 2022-05-24 DIAGNOSIS — J06.9 VIRAL URI WITH COUGH: ICD-10-CM

## 2022-05-24 DIAGNOSIS — Z20.822 COVID-19 RULED OUT: ICD-10-CM

## 2022-05-24 LAB
COVID ORDER STATUS COVID19: NORMAL
SARS-COV-2 RNA RESP QL NAA+PROBE: NOTDETECTED
SPECIMEN SOURCE: NORMAL

## 2022-05-24 NOTE — PROGRESS NOTES
Subjective:   CHIEF COMPLAINT  Chief Complaint   Patient presents with   • Sore Throat     Sore throat, fever, cough, throat feels swollen, like closing in, and trouble with breathing. Symptoms waxes and wanes, onset this weekend. Tx: Allergy pill, cold medication.        HPI  Praveena Landers is a 67 y.o. female who presents with a chief complaint of runny nose, sore throat, cough secondary to PND, fever and fatigue.  T-max of 100.1.  Currently afebrile.  She has tried ibuprofen and DayQuil which provided nominal relief of symptoms.  Admits associated symptoms of wheezing and shortness of breath.  No history of asthma.  Does not use tobacco.  She is vaccined against COVID x3.    REVIEW OF SYSTEMS  General: no fever or chills  GI: no nausea or vomiting  See HPI for further details.    PAST MEDICAL HISTORY  Patient Active Problem List    Diagnosis Date Noted   • Right ear pain 04/21/2022   • Medicare annual wellness visit, subsequent 07/14/2021   • Hip pain, acute, right 07/07/2020   • BRBPR (bright red blood per rectum) 03/31/2020   • Other constipation 03/31/2020   • Intercostal neuritis 03/11/2020   • Arthralgia of hip 03/11/2020   • Diverticulosis 03/11/2020   • LLQ abdominal pain 02/05/2020   • Bloody stool 02/05/2020   • Left-sided chest wall pain 02/05/2020   • Skin plaque 09/23/2019   • Herpes simplex infection of skin 09/23/2019   • Hormone replacement therapy (HRT) 09/23/2019   • Anxiety 04/27/2017   • Screening for breast cancer 10/25/2016   • Herpes simplex 10/03/2014   • Hypertension 07/09/2012       SURGICAL HISTORY   has a past surgical history that includes abdominal hysterectomy total (01/01/2001); wrist exploration; and breast biopsy (Right, 1975).    ALLERGIES  Allergies   Allergen Reactions   • Amlodipine Swelling   • Benazepril      Cough   • Penicillins        CURRENT MEDICATIONS  Home Medications     Reviewed by Caroline Avalos, Julito Ass't (Medical Assistant) on 05/23/22 at 1758  Med List Status:  "<None>   Medication Last Dose Status   acyclovir (ZOVIRAX) 200 MG Cap Taking Active   acyclovir (ZOVIRAX) 200 MG Cap Not Taking Flagged for Removal   BIOTIN PO Taking Active   Cholecalciferol (VITAMIN D) 2000 UNITS CAPS Taking Active   estradiol (ESTRACE) 1 MG TABS Taking Active   FOLIC ACID PO Taking Active   gabapentin (NEURONTIN) 100 MG Cap Not Taking Flagged for Removal   hydroCHLOROthiazide (HYDRODIURIL) 12.5 MG tablet Taking Active   losartan (COZAAR) 100 MG Tab Taking Active   Multiple Vitamins-Minerals (MULTIPLE VITAMINS/WOMENS PO) Taking Active   Probiotic Product (PROBIOTIC DAILY PO) Taking Active   valacyclovir (VALTREX) 1 GM Tab Taking Active                SOCIAL HISTORY  Social History     Tobacco Use   • Smoking status: Never Smoker   • Smokeless tobacco: Never Used   Vaping Use   • Vaping Use: Never used   Substance and Sexual Activity   • Alcohol use: Yes     Comment: 1gl wine daily   • Drug use: No   • Sexual activity: Yes     Partners: Male       FAMILY HISTORY  Family History   Problem Relation Age of Onset   • Cancer Mother         breast   • Cancer Father         lung   • Hypertension Father    • Diabetes Paternal Uncle    • Cancer Sister 62        colon cancer   • Heart Disease Neg Hx    • Stroke Neg Hx           Objective:   PHYSICAL EXAM  VITAL SIGNS: BP (!) 142/80 (BP Location: Left arm, Patient Position: Sitting, BP Cuff Size: Adult)   Pulse 86   Temp 37.2 °C (98.9 °F) (Temporal)   Resp 18   Ht 1.575 m (5' 2\")   Wt 49.9 kg (110 lb)   LMP 10/25/2002   SpO2 94%   BMI 20.12 kg/m²     Gen: no acute distress, normal voice  Skin: dry, intact, moist mucosal membranes  ENT: Pharyngeal erythema with minimal right sided exudate.  Uvula midline.  No edema of soft palate.  No lymphadenopathy.  Lungs: CTAB w/ symmetric expansion  CV: RRR w/o murmurs or clicks  Psych: normal affect, normal judgement, alert, awake    Rapid strep: Negative    Assessment/Plan:     1. Viral URI with cough  " COVID/SARS CoV-2 PCR    POCT Rapid Strep A   2. COVID-19 ruled out  COVID/SARS CoV-2 PCR    POCT Rapid Strep A   Signs symptoms are consistent with a viral upper respiratory infection should be self-limiting.  Patient is vaccinated against COVID.  - [facility]Med sinus rinses, Flonase and Zyrtec; handout provided  - Ibuprofen Tylenol as needed  - Ordered COVID PCR.  Results will be sent through [employer]  - Return to urgent care any new/worsening symptoms or further questions or concerns.  Patient understood everything discussed.  All questions were answered.      Differential diagnosis, natural history, supportive care, and indications for immediate follow-up discussed. All questions answered. Patient agrees with the plan of care.    Follow-up as needed if symptoms worsen or fail to improve to PCP, Urgent care or Emergency Room.    Please note that this dictation was created using voice recognition software. I have made a reasonable attempt to correct obvious errors, but I expect that there are errors of grammar and possibly content that I did not discover before finalizing the note.

## 2022-05-26 ENCOUNTER — TELEMEDICINE (OUTPATIENT)
Dept: MEDICAL GROUP | Facility: MEDICAL CENTER | Age: 67
End: 2022-05-26
Payer: MEDICARE

## 2022-05-26 VITALS
HEART RATE: 63 BPM | TEMPERATURE: 98.6 F | HEIGHT: 62 IN | BODY MASS INDEX: 19.96 KG/M2 | SYSTOLIC BLOOD PRESSURE: 120 MMHG | WEIGHT: 108.5 LBS | DIASTOLIC BLOOD PRESSURE: 76 MMHG

## 2022-05-26 DIAGNOSIS — J02.9 SORE THROAT: ICD-10-CM

## 2022-05-26 DIAGNOSIS — I10 HYPERTENSION, UNSPECIFIED TYPE: ICD-10-CM

## 2022-05-26 DIAGNOSIS — J06.9 VIRAL URI: ICD-10-CM

## 2022-05-26 DIAGNOSIS — I10 PRIMARY HYPERTENSION: ICD-10-CM

## 2022-05-26 PROCEDURE — 99213 OFFICE O/P EST LOW 20 MIN: CPT | Mod: 95 | Performed by: FAMILY MEDICINE

## 2022-05-26 RX ORDER — CEPHALEXIN 500 MG/1
1000 CAPSULE ORAL 2 TIMES DAILY
Qty: 40 CAPSULE | Refills: 0 | Status: SHIPPED | OUTPATIENT
Start: 2022-05-26 | End: 2022-06-05

## 2022-05-26 NOTE — ASSESSMENT & PLAN NOTE
Neg rapid strep and neg rapid covid   Neg covid PCR   She would like trial of abx   Not unreasonable, some note of exudates on exam in UC , poct not 100% sensitive   I do warn her that if this is mono (viral) she may break out in classic rash   And orders for this are sent in as well     Symptomatic and supportive care measures reviewed     20+ min spent

## 2022-06-09 ENCOUNTER — HOSPITAL ENCOUNTER (OUTPATIENT)
Dept: LAB | Facility: MEDICAL CENTER | Age: 67
End: 2022-06-09
Attending: FAMILY MEDICINE
Payer: MEDICARE

## 2022-06-09 DIAGNOSIS — J06.9 VIRAL URI: ICD-10-CM

## 2022-06-09 DIAGNOSIS — I10 HYPERTENSION, UNSPECIFIED TYPE: ICD-10-CM

## 2022-06-09 DIAGNOSIS — J02.9 SORE THROAT: ICD-10-CM

## 2022-06-09 LAB
ANION GAP SERPL CALC-SCNC: 12 MMOL/L (ref 7–16)
BUN SERPL-MCNC: 19 MG/DL (ref 8–22)
CALCIUM SERPL-MCNC: 9.1 MG/DL (ref 8.5–10.5)
CHLORIDE SERPL-SCNC: 103 MMOL/L (ref 96–112)
CHOLEST SERPL-MCNC: 204 MG/DL (ref 100–199)
CO2 SERPL-SCNC: 24 MMOL/L (ref 20–33)
CREAT SERPL-MCNC: 0.6 MG/DL (ref 0.5–1.4)
CREAT UR-MCNC: 150 MG/DL
FASTING STATUS PATIENT QL REPORTED: NORMAL
GFR SERPLBLD CREATININE-BSD FMLA CKD-EPI: 99 ML/MIN/1.73 M 2
GLUCOSE SERPL-MCNC: 97 MG/DL (ref 65–99)
HDLC SERPL-MCNC: 109 MG/DL
HETEROPH AB SER QL: NEGATIVE
LDLC SERPL CALC-MCNC: 86 MG/DL
MICROALBUMIN UR-MCNC: <1.2 MG/DL
MICROALBUMIN/CREAT UR: NORMAL MG/G (ref 0–30)
POTASSIUM SERPL-SCNC: 3.8 MMOL/L (ref 3.6–5.5)
SODIUM SERPL-SCNC: 139 MMOL/L (ref 135–145)
TRIGL SERPL-MCNC: 46 MG/DL (ref 0–149)

## 2022-06-09 PROCEDURE — 86308 HETEROPHILE ANTIBODY SCREEN: CPT

## 2022-06-09 PROCEDURE — 80048 BASIC METABOLIC PNL TOTAL CA: CPT

## 2022-06-09 PROCEDURE — 82043 UR ALBUMIN QUANTITATIVE: CPT

## 2022-06-09 PROCEDURE — 36415 COLL VENOUS BLD VENIPUNCTURE: CPT

## 2022-06-09 PROCEDURE — 80061 LIPID PANEL: CPT

## 2022-06-09 PROCEDURE — 82570 ASSAY OF URINE CREATININE: CPT

## 2022-07-04 DIAGNOSIS — B02.9 HERPES ZOSTER WITHOUT COMPLICATION: ICD-10-CM

## 2022-07-05 RX ORDER — VALACYCLOVIR HYDROCHLORIDE 1 G/1
1000 TABLET, FILM COATED ORAL DAILY
Qty: 90 TABLET | Refills: 3 | Status: SHIPPED | OUTPATIENT
Start: 2022-07-05 | End: 2023-06-23

## 2022-07-14 ENCOUNTER — PATIENT MESSAGE (OUTPATIENT)
Dept: HEALTH INFORMATION MANAGEMENT | Facility: OTHER | Age: 67
End: 2022-07-14

## 2022-08-09 ENCOUNTER — OFFICE VISIT (OUTPATIENT)
Dept: MEDICAL GROUP | Facility: MEDICAL CENTER | Age: 67
End: 2022-08-09
Payer: MEDICARE

## 2022-08-09 VITALS
WEIGHT: 110 LBS | HEIGHT: 62 IN | DIASTOLIC BLOOD PRESSURE: 68 MMHG | BODY MASS INDEX: 20.24 KG/M2 | RESPIRATION RATE: 16 BRPM | OXYGEN SATURATION: 98 % | SYSTOLIC BLOOD PRESSURE: 116 MMHG | TEMPERATURE: 97.9 F | HEART RATE: 68 BPM

## 2022-08-09 DIAGNOSIS — M25.551 RIGHT HIP PAIN: ICD-10-CM

## 2022-08-09 PROCEDURE — 99214 OFFICE O/P EST MOD 30 MIN: CPT | Performed by: FAMILY MEDICINE

## 2022-08-09 NOTE — ASSESSMENT & PLAN NOTE
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 Unlikely to be hip oa as she has no groin pain and no pain with internal rotation of the hip       Plan for sport med consult   X ray prior       30+ min spent       "

## 2022-08-09 NOTE — PROGRESS NOTES
This medical record contains text that has been entered with the assistance of computer voice recognition and dictation software.  Therefore, it may contain unintended errors in text, spelling, punctuation, or grammar        Chief Complaint   Patient presents with   • Hip Pain     Painful to walk but not all the time, warm to touch, bothers her at night. Right hip       Praveena Landers is a 67 y.o. female here evaluation and management of:     No fever chill       Current Outpatient Medications   Medication Sig Dispense Refill   • valacyclovir (VALTREX) 1 GM Tab TAKE 1 TABLET BY MOUTH EVERY DAY 90 Tablet 3   • losartan (COZAAR) 100 MG Tab TAKE 1 TABLET BY MOUTH EVERY  Tablet 0   • acyclovir (ZOVIRAX) 200 MG Cap Take 2 Capsules by mouth 3 times a day as needed. 60 capsule 0   • Probiotic Product (PROBIOTIC DAILY PO) Take  by mouth.     • Multiple Vitamins-Minerals (MULTIPLE VITAMINS/WOMENS PO) Take  by mouth.     • FOLIC ACID PO Take 1 Each by mouth every day at 6 PM.     • BIOTIN PO Take  by mouth.     • Cholecalciferol (VITAMIN D) 2000 UNITS CAPS Take 1 Cap by mouth.     • estradiol (ESTRACE) 1 MG TABS Take 1 mg by mouth every day. Takes 1/2 tab every other day       No current facility-administered medications for this visit.     Patient Active Problem List    Diagnosis Date Noted   • Right hip pain 08/09/2022   • Viral URI 05/26/2022   • Sore throat 05/26/2022   • Right ear pain 04/21/2022   • Medicare annual wellness visit, subsequent 07/14/2021   • Hip pain, acute, right 07/07/2020   • BRBPR (bright red blood per rectum) 03/31/2020   • Other constipation 03/31/2020   • Intercostal neuritis 03/11/2020   • Arthralgia of hip 03/11/2020   • Diverticulosis 03/11/2020   • LLQ abdominal pain 02/05/2020   • Bloody stool 02/05/2020   • Left-sided chest wall pain 02/05/2020   • Skin plaque 09/23/2019   • Herpes simplex infection of skin 09/23/2019   • Hormone replacement therapy (HRT) 09/23/2019   • Anxiety  "04/27/2017   • Screening for breast cancer 10/25/2016   • Herpes simplex 10/03/2014   • Hypertension 07/09/2012     Past Surgical History:   Procedure Laterality Date   • ABDOMINAL HYSTERECTOMY TOTAL  01/01/2001    partial, precancer cervix   • BREAST BIOPSY Right 1975    excisional bx-B9   • WRIST EXPLORATION      Right      Social History     Tobacco Use   • Smoking status: Never Smoker   • Smokeless tobacco: Never Used   Vaping Use   • Vaping Use: Never used   Substance Use Topics   • Alcohol use: Yes     Comment: 1gl wine daily   • Drug use: No     Family History   Problem Relation Age of Onset   • Cancer Mother         breast   • Cancer Father         lung   • Hypertension Father    • Diabetes Paternal Uncle    • Cancer Sister 62        colon cancer   • Heart Disease Neg Hx    • Stroke Neg Hx            ROS    all review of system completed and negative except for those listed above     Objective:     /68 (BP Location: Right arm, Patient Position: Sitting, BP Cuff Size: Adult)   Pulse 68   Temp 36.6 °C (97.9 °F) (Temporal)   Resp 16   Ht 1.575 m (5' 2\")   Wt 49.9 kg (110 lb)   SpO2 98%  Body mass index is 20.12 kg/m².  Physical Exam:    Constitutional: Alert, no distress.  Skin: Warm, dry, good turgor, no rashes in visible areas.  Eye: Equal, round and reactive, conjunctiva clear, lids normal.  ENMT: Lips without lesions, good dentition, oropharynx clear.  Neck: Trachea midline, no masses, no thyromegaly. No cervical or supraclavicular lymphadenopathy.  Respiratory: Unlabored respiratory effort, lungs clear to auscultation, no wheezes, no ronchi.  Cardiovascular: Normal S1, S2, no murmur, no edema.  Abdomen: Soft, non-tender, no masses, no hepatosplenomegaly.  Psych: Alert and oriented x3, normal affect and mood.          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 Unlikely to be hip oa as she has no groin pain and no pain with internal rotation of the hip       Plan for sport med consult   X ray prior       30+ min spent                Relevant Orders    Referral to Sports Medicine    DX-HIP-COMPLETE - UNILATERAL 2+ RIGHT    DX-LUMBAR SPINE-2 OR 3 VIEWS                Instructed to follow up if symptoms worsen or fail to improve, ER/UC precautions discussed as well    Alexus Nicole MD  [facility], Family Medicine   [address] [place] [address]   [place] [address]  Phone: [phone]               "

## 2022-08-10 ENCOUNTER — HOSPITAL ENCOUNTER (OUTPATIENT)
Dept: RADIOLOGY | Facility: MEDICAL CENTER | Age: 67
End: 2022-08-10
Attending: FAMILY MEDICINE
Payer: MEDICARE

## 2022-08-10 DIAGNOSIS — M25.551 RIGHT HIP PAIN: ICD-10-CM

## 2022-08-10 PROCEDURE — 73502 X-RAY EXAM HIP UNI 2-3 VIEWS: CPT | Mod: RT

## 2022-08-10 PROCEDURE — 72100 X-RAY EXAM L-S SPINE 2/3 VWS: CPT

## 2022-08-12 SDOH — ECONOMIC STABILITY: FOOD INSECURITY: WITHIN THE PAST 12 MONTHS, YOU WORRIED THAT YOUR FOOD WOULD RUN OUT BEFORE YOU GOT MONEY TO BUY MORE.: NEVER TRUE

## 2022-08-12 SDOH — ECONOMIC STABILITY: INCOME INSECURITY: IN THE LAST 12 MONTHS, WAS THERE A TIME WHEN YOU WERE NOT ABLE TO PAY THE MORTGAGE OR RENT ON TIME?: NO

## 2022-08-12 SDOH — HEALTH STABILITY: PHYSICAL HEALTH: ON AVERAGE, HOW MANY DAYS PER WEEK DO YOU ENGAGE IN MODERATE TO STRENUOUS EXERCISE (LIKE A BRISK WALK)?: 4 DAYS

## 2022-08-12 SDOH — ECONOMIC STABILITY: FOOD INSECURITY: WITHIN THE PAST 12 MONTHS, THE FOOD YOU BOUGHT JUST DIDN'T LAST AND YOU DIDN'T HAVE MONEY TO GET MORE.: NEVER TRUE

## 2022-08-12 SDOH — ECONOMIC STABILITY: HOUSING INSECURITY: IN THE LAST 12 MONTHS, HOW MANY PLACES HAVE YOU LIVED?: 1

## 2022-08-12 SDOH — HEALTH STABILITY: PHYSICAL HEALTH: ON AVERAGE, HOW MANY MINUTES DO YOU ENGAGE IN EXERCISE AT THIS LEVEL?: 30 MIN

## 2022-08-12 SDOH — ECONOMIC STABILITY: INCOME INSECURITY: HOW HARD IS IT FOR YOU TO PAY FOR THE VERY BASICS LIKE FOOD, HOUSING, MEDICAL CARE, AND HEATING?: NOT VERY HARD

## 2022-08-12 SDOH — HEALTH STABILITY: MENTAL HEALTH
STRESS IS WHEN SOMEONE FEELS TENSE, NERVOUS, ANXIOUS, OR CAN'T SLEEP AT NIGHT BECAUSE THEIR MIND IS TROUBLED. HOW STRESSED ARE YOU?: RATHER MUCH

## 2022-08-12 ASSESSMENT — LIFESTYLE VARIABLES
HOW OFTEN DO YOU HAVE SIX OR MORE DRINKS ON ONE OCCASION: NEVER
SKIP TO QUESTIONS 9-10: 1
AUDIT-C TOTAL SCORE: 3
HOW OFTEN DO YOU HAVE A DRINK CONTAINING ALCOHOL: 2-3 TIMES A WEEK
HOW MANY STANDARD DRINKS CONTAINING ALCOHOL DO YOU HAVE ON A TYPICAL DAY: 1 OR 2

## 2022-08-12 ASSESSMENT — SOCIAL DETERMINANTS OF HEALTH (SDOH)
HOW OFTEN DO YOU HAVE SIX OR MORE DRINKS ON ONE OCCASION: NEVER
HOW OFTEN DO YOU HAVE A DRINK CONTAINING ALCOHOL: 2-3 TIMES A WEEK
HOW MANY DRINKS CONTAINING ALCOHOL DO YOU HAVE ON A TYPICAL DAY WHEN YOU ARE DRINKING: 1 OR 2
HOW OFTEN DO YOU ATTENT MEETINGS OF THE CLUB OR ORGANIZATION YOU BELONG TO?: NEVER
HOW OFTEN DO YOU ATTEND CHURCH OR RELIGIOUS SERVICES?: NEVER
DO YOU BELONG TO ANY CLUBS OR ORGANIZATIONS SUCH AS CHURCH GROUPS UNIONS, FRATERNAL OR ATHLETIC GROUPS, OR SCHOOL GROUPS?: NO
HOW OFTEN DO YOU ATTENT MEETINGS OF THE CLUB OR ORGANIZATION YOU BELONG TO?: NEVER
HOW OFTEN DO YOU GET TOGETHER WITH FRIENDS OR RELATIVES?: THREE TIMES A WEEK
HOW OFTEN DO YOU ATTEND CHURCH OR RELIGIOUS SERVICES?: NEVER
DO YOU BELONG TO ANY CLUBS OR ORGANIZATIONS SUCH AS CHURCH GROUPS UNIONS, FRATERNAL OR ATHLETIC GROUPS, OR SCHOOL GROUPS?: NO
IN A TYPICAL WEEK, HOW MANY TIMES DO YOU TALK ON THE PHONE WITH FAMILY, FRIENDS, OR NEIGHBORS?: MORE THAN THREE TIMES A WEEK
HOW HARD IS IT FOR YOU TO PAY FOR THE VERY BASICS LIKE FOOD, HOUSING, MEDICAL CARE, AND HEATING?: NOT VERY HARD
WITHIN THE PAST 12 MONTHS, YOU WORRIED THAT YOUR FOOD WOULD RUN OUT BEFORE YOU GOT THE MONEY TO BUY MORE: NEVER TRUE
IN A TYPICAL WEEK, HOW MANY TIMES DO YOU TALK ON THE PHONE WITH FAMILY, FRIENDS, OR NEIGHBORS?: MORE THAN THREE TIMES A WEEK
HOW OFTEN DO YOU GET TOGETHER WITH FRIENDS OR RELATIVES?: THREE TIMES A WEEK

## 2022-08-15 ENCOUNTER — OFFICE VISIT (OUTPATIENT)
Dept: SPORTS MEDICINE | Facility: CLINIC | Age: 67
End: 2022-08-15
Payer: MEDICARE

## 2022-08-15 VITALS
DIASTOLIC BLOOD PRESSURE: 76 MMHG | HEART RATE: 76 BPM | WEIGHT: 110 LBS | RESPIRATION RATE: 16 BRPM | TEMPERATURE: 98.2 F | OXYGEN SATURATION: 95 % | HEIGHT: 62 IN | BODY MASS INDEX: 20.24 KG/M2 | SYSTOLIC BLOOD PRESSURE: 116 MMHG

## 2022-08-15 DIAGNOSIS — M70.61 TROCHANTERIC BURSITIS, RIGHT HIP: ICD-10-CM

## 2022-08-15 PROCEDURE — 20610 DRAIN/INJ JOINT/BURSA W/O US: CPT | Mod: RT | Performed by: FAMILY MEDICINE

## 2022-08-15 RX ORDER — TRIAMCINOLONE ACETONIDE 40 MG/ML
40 INJECTION, SUSPENSION INTRA-ARTICULAR; INTRAMUSCULAR ONCE
Status: COMPLETED | OUTPATIENT
Start: 2022-08-15 | End: 2022-08-15

## 2022-08-15 RX ADMIN — TRIAMCINOLONE ACETONIDE 40 MG: 40 INJECTION, SUSPENSION INTRA-ARTICULAR; INTRAMUSCULAR at 10:29

## 2022-08-15 ASSESSMENT — ENCOUNTER SYMPTOMS
FEVER: 0
DIZZINESS: 0
NAUSEA: 0
CHILLS: 0
VOMITING: 0
SHORTNESS OF BREATH: 0

## 2022-08-15 NOTE — PROGRESS NOTES
Chief Complaint   Patient presents with    Hip Pain     Referral from / R hip pain        Subjective     Referred by Alexus Nicole MD  for evaluation of RIGHT hip pain  Insidious  6 weeks ago, had some low back pain and then went on to have RIGHT hip pain  Worsening, constant with walking and sitting  Pain is predominantly in the region of the RIGHT lateral hip  Feels like a dull ache while standing, POSITIVE night symptoms with pain if she is lying with a flexed hip, particularly if she is sleeping on the contralateral side  Improved with lying supine  Occasional radiation along the trajectory of the RIGHT lateral thigh/IT band region  POSITIVE prior history of falls, but does not recall anything specific with regard to prior injury to the RIGHT hip, she did have a prior injury which involved falling off of a ladder and injuring the RIGHT knee with subsequent pain in the knee with cold weather  Ibuprofen, helps some  She has had some imaging of the lumbar spine as well as the hip  She has been seen by her PCP    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   Respiratory:  Negative for shortness of breath.    Cardiovascular:  Negative for chest pain.   Gastrointestinal:  Negative for nausea and vomiting.   Neurological:  Negative for dizziness.     PMH:  has a past medical history of GERD (gastroesophageal reflux disease) (10/3/2014), Herpes simplex, and Hypertension.  MEDS:   Current Outpatient Medications:     valacyclovir (VALTREX) 1 GM Tab, TAKE 1 TABLET BY MOUTH EVERY DAY, Disp: 90 Tablet, Rfl: 3    losartan (COZAAR) 100 MG Tab, TAKE 1 TABLET BY MOUTH EVERY DAY, Disp: 100 Tablet, Rfl: 0    acyclovir (ZOVIRAX) 200 MG Cap, Take 2 Capsules by mouth 3 times a day as needed., Disp: 60 capsule, Rfl: 0    Probiotic Product (PROBIOTIC DAILY PO), Take  by mouth., Disp: , Rfl:      "Multiple Vitamins-Minerals (MULTIPLE VITAMINS/WOMENS PO), Take  by mouth., Disp: , Rfl:     FOLIC ACID PO, Take 1 Each by mouth every day at 6 PM., Disp: , Rfl:     BIOTIN PO, Take  by mouth., Disp: , Rfl:     Cholecalciferol (VITAMIN D) 2000 UNITS CAPS, Take 1 Cap by mouth., Disp: , Rfl:     estradiol (ESTRACE) 1 MG TABS, Take 1 mg by mouth every day. Takes 1/2 tab every other day, Disp: , Rfl:   ALLERGIES:   Allergies   Allergen Reactions    Amlodipine Swelling    Benazepril      Cough    Penicillins      SURGHX:   Past Surgical History:   Procedure Laterality Date    ABDOMINAL HYSTERECTOMY TOTAL  01/01/2001    partial, precancer cervix    BREAST BIOPSY Right 1975    excisional bx-B9    WRIST EXPLORATION      Right     SOCHX:  reports that she has never smoked. She has never used smokeless tobacco. She reports current alcohol use. She reports that she does not use drugs.  FH: Family history was reviewed, no pertinent findings to report    Objective   /76 (BP Location: Left arm, Patient Position: Sitting, BP Cuff Size: Adult)   Pulse 76   Temp 36.8 °C (98.2 °F) (Temporal)   Resp 16   Ht 1.575 m (5' 2\")   Wt 49.9 kg (110 lb)   LMP 10/25/2002   SpO2 95%   BMI 20.12 kg/m²     Lumbar spine exam:  No acute distress  Able to walk on heels and toes  Able to flex to 90° without discomfort  Extension and lateral rotation without discomfort  Strength testing with hip flexion, knee flexion and extension, ankle dorsiflexion and plantarflexion, and EHL testing were 5 out of 5 bilaterally  Sensation was intact bilaterally  The legs were otherwise neurovascularly intact    HIP EXAM:  NORMAL gait    Right hip: Range of motion is intact  Mild pain with internal rotation  peter's test is NEGATIVE  POSITIVE tenderness of the trochanteric bursa  Mild tenderness of the gluteus medius  Tereso's test is POSITIVE    Left hip: Range of motion is intact  NEGATIVE pain with internal rotation  peter's test is NEGATIVE  NO " tenderness of the trochanteric bursa  NO tenderness of the gluteus medius  Tereso's test is NEGATIVE    1. Trochanteric bursitis, right hip  triamcinolone acetonide (KENALOG-40) injection 40 mg        Insidious  6 weeks ago, had some low back pain and then went on to have RIGHT hip pain  Worsening, constant with walking and sitting    RIGHT trochanteric bursa cortisone injection performed the office TODAY (August 15, 2022)    She was also provided with hip exercises    Return in about 4 weeks (around 9/12/2022).  To see how she is doing 1 month after RIGHT hip trochanteric bursa corticosteroid injection and see how she is doing with her hip exercises            8/10/2022 9:51 AM     HISTORY/REASON FOR EXAM:  Right gluteal pain for 6 weeks..        TECHNIQUE/EXAM DESCRIPTION AND NUMBER OF VIEWS:  2 views of the RIGHT hip.     COMPARISON: None     FINDINGS:  SI joints and pubic symphysis are intact. There is no suspicious osseous lesion. No acute fracture or dislocation. No significant degenerative change. Phleboliths are present in the pelvis.     IMPRESSION:     No radiographic evidence of acute traumatic injury.           Exam Ended: 08/10/22 10:00 AM Last Resulted: 08/10/22 10:15 AM                    8/10/2022 9:51 AM     HISTORY/REASON FOR EXAM:  Atraumatic Pain.  Right gluteal pain for 6 weeks.     TECHNIQUE/ EXAM DESCRIPTION AND NUMBER OF VIEWS:  3 views of the lumbar spine.     COMPARISON: None.     FINDINGS:  The lowest formed intervertebral disc will be designated L5-S1 for the purposes of this report and vertebral levels numbered accordingly.     No acute fracture is evident.  No gross malalignment is seen. There is minimal rightward curvature of the lumbar spine. There is minimal anterolisthesis of L4 on L5  The disc spaces are well maintained and symmetrical.  There are small anterior osteophytes at L5 L4 and L3.  There is no evidence of spondylolisthesis or osseous lesion.  There are minimal degenerative  changes of the mid to lower lumbar facet joints.     IMPRESSION:     1.  Slight rightward curvature of the lumbar spine.  2.  Minimal anterolisthesis of L4 on L5.  3.  Small anterior osteophytes at L3, L4 and L5.  4.  Minimal degenerative change of the lower lumbar facet joints.           Exam Ended: 08/10/22 10:01 AM Last Resulted: 08/10/22 10:16 AM           Interpreted in the office today with the patient    Thank you Alexus Nicole MD for allowing me to participate in caring for your patient.

## 2022-08-15 NOTE — PROCEDURES
PROCEDURE NOTE:  right HIP, trochanteric corticosteroid injection  Consent was obtained, using sterile technique the hip was prepped and 5 ml's of 2% Marcaine  lateral approach.   Vapocoolant spray used  kenalog 40 mg and 5 ml 2% lidocaine was injected and the needle withdrawn.  The procedure was well tolerated.    Watch for fever, or increased swelling or persistent pain in injection site.   Call or return to clinic prn if such symptoms occur or the hip fails to improve as anticipated.

## 2022-08-15 NOTE — Clinical Note
Jeff Vaughan, Thank you for referring Praveena to our sports medicine clinic. We did provide her with a corticosteroid injection of the RIGHT trochanteric bursae at today's visit.  We also provided her with some exercises to work on. We will plan on seeing her back in a month to see how she is coming along. Thanks! L

## 2022-08-19 ENCOUNTER — TELEPHONE (OUTPATIENT)
Dept: HEALTH INFORMATION MANAGEMENT | Facility: OTHER | Age: 67
End: 2022-08-19

## 2022-09-12 ENCOUNTER — OFFICE VISIT (OUTPATIENT)
Dept: SPORTS MEDICINE | Facility: CLINIC | Age: 67
End: 2022-09-12
Payer: MEDICARE

## 2022-09-12 VITALS
TEMPERATURE: 98.4 F | RESPIRATION RATE: 16 BRPM | SYSTOLIC BLOOD PRESSURE: 122 MMHG | OXYGEN SATURATION: 97 % | WEIGHT: 110 LBS | HEART RATE: 68 BPM | DIASTOLIC BLOOD PRESSURE: 78 MMHG | HEIGHT: 62 IN | BODY MASS INDEX: 20.24 KG/M2

## 2022-09-12 DIAGNOSIS — M70.61 TROCHANTERIC BURSITIS, RIGHT HIP: ICD-10-CM

## 2022-09-12 PROCEDURE — 99213 OFFICE O/P EST LOW 20 MIN: CPT | Performed by: FAMILY MEDICINE

## 2022-09-12 NOTE — PROGRESS NOTES
"Chief Complaint   Patient presents with    Hip Pain     F/V R hip pain      Subjective     Referred by Alexus Nicole MD  for evaluation of RIGHT hip pain  Insidious  Mid July 2022, had some low back pain and then went on to have RIGHT hip pain  Worsening, constant with walking and sitting  Pain is predominantly in the region of the RIGHT lateral hip  Feels like a dull ache while standing, POSITIVE night symptoms with pain if she is lying with a flexed hip, particularly if she is sleeping on the contralateral side  Improved with lying supine  Occasional radiation along the trajectory of the RIGHT lateral thigh/IT band region  POSITIVE prior history of falls, but does not recall anything specific with regard to prior injury to the RIGHT hip, she did have a prior injury which involved falling off of a ladder and injuring the RIGHT knee with subsequent pain in the knee with cold weather  Ibuprofen, helps some  She has had some imaging of the lumbar spine as well as the hip  She has been seen by her PCP    Corticosteroid injection did NOT help  BUT decreased walking and exercises have helped some    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, desk work  Walks regularly, 3 to 4 miles daily, stairs  Keeping up her home    Objective   /78 (BP Location: Left arm, Patient Position: Sitting, BP Cuff Size: Adult)   Pulse 68   Temp 36.9 °C (98.4 °F) (Temporal)   Resp 16   Ht 1.575 m (5' 2\")   Wt 49.9 kg (110 lb)   LMP 10/25/2002   SpO2 97%   BMI 20.12 kg/m²     HIP EXAM:  NORMAL gait    Right hip: Range of motion is intact  Mild pain with internal rotation  peter's test is NEGATIVE  NO tenderness of the trochanteric bursa  Mild tenderness of the gluteus medius and minimus  Tereso's test is POSITIVE    Left hip: Range of motion is intact  NEGATIVE pain with internal rotation  peter's test is NEGATIVE  NO tenderness of the trochanteric bursa  NO tenderness of the gluteus medius  Tereso's test is NEGATIVE    1. Trochanteric " bursitis, right hip          Insidious  Around mid July 2020, had some low back pain and then went on to have RIGHT hip pain  Worsened with walking and sitting    RIGHT trochanteric bursa cortisone injection performed (August 15, 2022) did NOT help  Although, she does have DECREASED tenderness of the greater trochanteric region on examination compared to her initial exam    Fortunately, she has improved with hip exercises  She has also noticed that dancing ballet has helped strengthen the gluteal muscles and she feels LESS pain and more gluteal strength    Continue exercises  Follow-up as needed        8/10/2022 9:51 AM     HISTORY/REASON FOR EXAM:  Right gluteal pain for 6 weeks..        TECHNIQUE/EXAM DESCRIPTION AND NUMBER OF VIEWS:  2 views of the RIGHT hip.     COMPARISON: None     FINDINGS:  SI joints and pubic symphysis are intact. There is no suspicious osseous lesion. No acute fracture or dislocation. No significant degenerative change. Phleboliths are present in the pelvis.     IMPRESSION:     No radiographic evidence of acute traumatic injury.           Exam Ended: 08/10/22 10:00 AM Last Resulted: 08/10/22 10:15 AM                    8/10/2022 9:51 AM     HISTORY/REASON FOR EXAM:  Atraumatic Pain.  Right gluteal pain for 6 weeks.     TECHNIQUE/ EXAM DESCRIPTION AND NUMBER OF VIEWS:  3 views of the lumbar spine.     COMPARISON: None.     FINDINGS:  The lowest formed intervertebral disc will be designated L5-S1 for the purposes of this report and vertebral levels numbered accordingly.     No acute fracture is evident.  No gross malalignment is seen. There is minimal rightward curvature of the lumbar spine. There is minimal anterolisthesis of L4 on L5  The disc spaces are well maintained and symmetrical.  There are small anterior osteophytes at L5 L4 and L3.  There is no evidence of spondylolisthesis or osseous lesion.  There are minimal degenerative changes of the mid to lower lumbar facet joints.      IMPRESSION:     1.  Slight rightward curvature of the lumbar spine.  2.  Minimal anterolisthesis of L4 on L5.  3.  Small anterior osteophytes at L3, L4 and L5.  4.  Minimal degenerative change of the lower lumbar facet joints.           Exam Ended: 08/10/22 10:01 AM Last Resulted: 08/10/22 10:16 AM             Thank you Alexus Nicole MD for allowing me to participate in caring for your patient.

## 2022-09-12 NOTE — Clinical Note
Jeff Vaughan, We saw Praveena again for her hip pain.  Fortunately she is BETTER. She feels her corticosteroid injection did not help, but she does have decreased tenderness at the trochanteric bursa on examination.  She seems to have turned a corner with her exercise program.  She is also dancing ballet which seems to be helping her gluteal strength. Since she seems to be on the right track, we will have her follow-up on an as-needed basis. Hope you are well! L

## 2022-11-29 DIAGNOSIS — I10 ESSENTIAL HYPERTENSION: ICD-10-CM

## 2022-11-29 RX ORDER — LOSARTAN POTASSIUM 100 MG/1
100 TABLET ORAL
Qty: 100 TABLET | Refills: 0 | Status: SHIPPED | OUTPATIENT
Start: 2022-11-29 | End: 2023-03-07

## 2022-11-29 NOTE — TELEPHONE ENCOUNTER
Received request via: Pharmacy    Was the patient seen in the last year in this department? Yes    Does the patient have an active prescription (recently filled or refills available) for medication(s) requested? No    Does the patient have long term Plus and need 100 day supply (blood pressure, diabetes and cholesterol meds only)? Yes, quantity updated to 100 days

## 2023-01-19 ENCOUNTER — OFFICE VISIT (OUTPATIENT)
Dept: MEDICAL GROUP | Facility: MEDICAL CENTER | Age: 68
End: 2023-01-19
Payer: MEDICARE

## 2023-01-19 VITALS
WEIGHT: 111.55 LBS | HEIGHT: 62 IN | DIASTOLIC BLOOD PRESSURE: 78 MMHG | HEART RATE: 68 BPM | BODY MASS INDEX: 20.53 KG/M2 | SYSTOLIC BLOOD PRESSURE: 156 MMHG | OXYGEN SATURATION: 97 % | TEMPERATURE: 98.6 F

## 2023-01-19 DIAGNOSIS — Z13.1 SCREENING FOR DIABETES MELLITUS (DM): ICD-10-CM

## 2023-01-19 DIAGNOSIS — Z11.59 NEED FOR HEPATITIS C SCREENING TEST: ICD-10-CM

## 2023-01-19 DIAGNOSIS — I10 HYPERTENSION, UNSPECIFIED TYPE: ICD-10-CM

## 2023-01-19 DIAGNOSIS — Z13.6 SCREENING FOR ISCHEMIC HEART DISEASE: ICD-10-CM

## 2023-01-19 DIAGNOSIS — Z12.11 SCREENING FOR COLON CANCER: ICD-10-CM

## 2023-01-19 PROCEDURE — 99214 OFFICE O/P EST MOD 30 MIN: CPT | Performed by: FAMILY MEDICINE

## 2023-01-19 RX ORDER — HYDROCHLOROTHIAZIDE 12.5 MG/1
12.5 TABLET ORAL
COMMUNITY
Start: 2023-01-11 | End: 2023-04-13

## 2023-01-19 ASSESSMENT — PATIENT HEALTH QUESTIONNAIRE - PHQ9: CLINICAL INTERPRETATION OF PHQ2 SCORE: 0

## 2023-01-19 NOTE — PROGRESS NOTES
This medical record contains text that has been entered with the assistance of computer voice recognition and dictation software.  Therefore, it may contain unintended errors in text, spelling, punctuation, or grammar        Chief Complaint   Patient presents with    Follow-Up     Blood work results        Praveena Landers is a 67 y.o. female here evaluation and management of:       Current Outpatient Medications   Medication Sig Dispense Refill    losartan (COZAAR) 100 MG Tab TAKE 1 TABLET BY MOUTH EVERY  Tablet 0    valacyclovir (VALTREX) 1 GM Tab TAKE 1 TABLET BY MOUTH EVERY DAY 90 Tablet 3    acyclovir (ZOVIRAX) 200 MG Cap Take 2 Capsules by mouth 3 times a day as needed. 60 capsule 0    Probiotic Product (PROBIOTIC DAILY PO) Take  by mouth.      Multiple Vitamins-Minerals (MULTIPLE VITAMINS/WOMENS PO) Take  by mouth.      FOLIC ACID PO Take 1 Each by mouth every day at 6 PM.      BIOTIN PO Take  by mouth.      Cholecalciferol (VITAMIN D) 2000 UNITS CAPS Take 1 Cap by mouth.      estradiol (ESTRACE) 1 MG TABS Take 1 mg by mouth every day. Takes 1/2 tab every other day      hydroCHLOROthiazide (HYDRODIURIL) 12.5 MG tablet Take 12.5 mg by mouth every day.       No current facility-administered medications for this visit.     Patient Active Problem List    Diagnosis Date Noted    Right hip pain 08/09/2022    Viral URI 05/26/2022    Sore throat 05/26/2022    Right ear pain 04/21/2022    Medicare annual wellness visit, subsequent 07/14/2021    Hip pain, acute, right 07/07/2020    BRBPR (bright red blood per rectum) 03/31/2020    Other constipation 03/31/2020    Intercostal neuritis 03/11/2020    Arthralgia of hip 03/11/2020    Diverticulosis 03/11/2020    LLQ abdominal pain 02/05/2020    Bloody stool 02/05/2020    Left-sided chest wall pain 02/05/2020    Skin plaque 09/23/2019    Herpes simplex infection of skin 09/23/2019    Hormone replacement therapy (HRT) 09/23/2019    Anxiety 04/27/2017    Screening for  "breast cancer 10/25/2016    Herpes simplex 10/03/2014    Hypertension 07/09/2012     Past Surgical History:   Procedure Laterality Date    ABDOMINAL HYSTERECTOMY TOTAL  01/01/2001    partial, precancer cervix    BREAST BIOPSY Right 1975    excisional bx-B9    WRIST EXPLORATION      Right    WRIST EXPLORATION Right     cyst removal      Social History     Tobacco Use    Smoking status: Never    Smokeless tobacco: Never   Vaping Use    Vaping Use: Never used   Substance Use Topics    Alcohol use: Yes     Comment: 1gl wine daily    Drug use: No     Family History   Problem Relation Age of Onset    Cancer Mother         breast    Cancer Father         lung    Hypertension Father     Diabetes Paternal Uncle     Cancer Sister 62        colon cancer    Heart Disease Neg Hx     Stroke Neg Hx            ROS    all review of system completed and negative except for those listed above     Objective:     BP (!) 156/78 (BP Location: Right arm, Patient Position: Sitting, BP Cuff Size: Adult long)   Pulse 68   Temp 37 °C (98.6 °F) (Temporal)   Ht 1.575 m (5' 2\")   Wt 50.6 kg (111 lb 8.8 oz)   SpO2 97%  Body mass index is 20.4 kg/m².  Physical Exam:        GEN: comfortable, alert and oriented, well nourished, well developed, in no apparent distress   HEENT: NCAT, eyes: pupils equal and reactive, sclera white, EOMIT, good dentition  HEART: limbs warm and well perfused, regular rate, no JVD, no lower extremity edema  LUNGS: speaking in full sentences, not in apparent respiratory distress, no audible wheezes  MSK: normal tone and bulk, no swelling of the joints, gait steady and normal           Assessment and Plan:   The following treatment plan was discussed        Problem List Items Addressed This Visit       Hypertension     Elevated   Not ready for adjustment of medication today   Had stressful week   Plan for home blood pressure monitoring   Follow up with me 1-2 mo (I have 2 mo access) and we will review home blood pressure " log and adjust meds and review labs     20+ min spent          Relevant Medications    hydroCHLOROthiazide (HYDRODIURIL) 12.5 MG tablet    Other Relevant Orders    Basic Metabolic Panel    Lipid Profile    MICROALBUMIN CREAT RATIO URINE     Other Visit Diagnoses       Screening for diabetes mellitus (DM)        Relevant Orders    Basic Metabolic Panel    Lipid Profile    MICROALBUMIN CREAT RATIO URINE    Screening for colon cancer        Screening for ischemic heart disease        Relevant Orders    Basic Metabolic Panel    Lipid Profile    MICROALBUMIN CREAT RATIO URINE    Need for hepatitis C screening test        Relevant Orders    HEP C VIRUS ANTIBODY                  Instructed to follow up if symptoms worsen or fail to improve, ER/UC precautions discussed as well    Alexus Nicole MD  [facility], Family Medicine   [address] [place] [address] Pkw[address]   [place] [address]  Phone: [phone]

## 2023-01-19 NOTE — ASSESSMENT & PLAN NOTE
Elevated   Not ready for adjustment of medication today   Had stressful week   Plan for home blood pressure monitoring   Follow up with me 1-2 mo (I have 2 mo access) and we will review home blood pressure log and adjust meds and review labs     20+ min spent

## 2023-01-24 ENCOUNTER — TELEPHONE (OUTPATIENT)
Dept: RADIOLOGY | Facility: MEDICAL CENTER | Age: 68
End: 2023-01-24

## 2023-03-03 DIAGNOSIS — I10 ESSENTIAL HYPERTENSION: ICD-10-CM

## 2023-03-06 NOTE — TELEPHONE ENCOUNTER
Received request via: Pharmacy    Was the patient seen in the last year in this department? Yes    Does the patient have an active prescription (recently filled or refills available) for medication(s) requested? No    Does the patient have skilled nursing Plus and need 100 day supply (blood pressure, diabetes and cholesterol meds only)? Yes, quantity updated to 100 days

## 2023-03-07 RX ORDER — LOSARTAN POTASSIUM 100 MG/1
100 TABLET ORAL
Qty: 100 TABLET | Refills: 0 | Status: SHIPPED | OUTPATIENT
Start: 2023-03-07 | End: 2023-06-13

## 2023-03-16 ENCOUNTER — HOSPITAL ENCOUNTER (OUTPATIENT)
Dept: LAB | Facility: MEDICAL CENTER | Age: 68
End: 2023-03-16
Attending: FAMILY MEDICINE
Payer: MEDICARE

## 2023-03-16 DIAGNOSIS — Z13.6 SCREENING FOR ISCHEMIC HEART DISEASE: ICD-10-CM

## 2023-03-16 DIAGNOSIS — Z11.59 NEED FOR HEPATITIS C SCREENING TEST: ICD-10-CM

## 2023-03-16 DIAGNOSIS — I10 HYPERTENSION, UNSPECIFIED TYPE: ICD-10-CM

## 2023-03-16 DIAGNOSIS — Z13.1 SCREENING FOR DIABETES MELLITUS (DM): ICD-10-CM

## 2023-03-16 LAB
ANION GAP SERPL CALC-SCNC: 13 MMOL/L (ref 7–16)
BUN SERPL-MCNC: 20 MG/DL (ref 8–22)
CALCIUM SERPL-MCNC: 9.7 MG/DL (ref 8.5–10.5)
CHLORIDE SERPL-SCNC: 103 MMOL/L (ref 96–112)
CHOLEST SERPL-MCNC: 233 MG/DL (ref 100–199)
CO2 SERPL-SCNC: 25 MMOL/L (ref 20–33)
CREAT SERPL-MCNC: 0.63 MG/DL (ref 0.5–1.4)
FASTING STATUS PATIENT QL REPORTED: NORMAL
GFR SERPLBLD CREATININE-BSD FMLA CKD-EPI: 97 ML/MIN/1.73 M 2
GLUCOSE SERPL-MCNC: 89 MG/DL (ref 65–99)
HCV AB SER QL: NORMAL
HDLC SERPL-MCNC: 137 MG/DL
LDLC SERPL CALC-MCNC: 85 MG/DL
POTASSIUM SERPL-SCNC: 4.3 MMOL/L (ref 3.6–5.5)
SODIUM SERPL-SCNC: 141 MMOL/L (ref 135–145)
TRIGL SERPL-MCNC: 54 MG/DL (ref 0–149)

## 2023-03-16 PROCEDURE — 80048 BASIC METABOLIC PNL TOTAL CA: CPT

## 2023-03-16 PROCEDURE — 80061 LIPID PANEL: CPT

## 2023-03-16 PROCEDURE — 36415 COLL VENOUS BLD VENIPUNCTURE: CPT

## 2023-03-16 PROCEDURE — 86803 HEPATITIS C AB TEST: CPT

## 2023-03-16 PROCEDURE — 82043 UR ALBUMIN QUANTITATIVE: CPT

## 2023-03-16 PROCEDURE — 82570 ASSAY OF URINE CREATININE: CPT

## 2023-03-17 LAB
CREAT UR-MCNC: 100.77 MG/DL
MICROALBUMIN UR-MCNC: <1.2 MG/DL
MICROALBUMIN/CREAT UR: NORMAL MG/G (ref 0–30)

## 2023-03-21 NOTE — PROGRESS NOTES
Virtual Visit: Established Patient   This visit was conducted via Zoom using secure and encrypted videoconferencing technology.   The patient was in their home in the state of [place].    The patient's identity was confirmed and verbal consent was obtained for this virtual visit.     Subjective:   CC:   Chief Complaint   Patient presents with   • Hypertension Follow-up     Follow up   • Cough     Sore throat & fever x 6 day, tested negative for covid & strep, would like to discuss options, not going away       Praveena Landers is a 67 y.o. female presenting for evaluation and management of:      Current medicines (including changes today)  Current Outpatient Medications   Medication Sig Dispense Refill   • cephALEXin (KEFLEX) 500 MG Cap Take 2 Capsules by mouth 2 times a day for 10 days. 40 Capsule 0   • hydroCHLOROthiazide (HYDRODIURIL) 12.5 MG tablet Take 1 Tablet by mouth every day for 90 days. 90 Tablet 3   • losartan (COZAAR) 100 MG Tab TAKE 1 TABLET BY MOUTH EVERY  Tablet 0   • valacyclovir (VALTREX) 1 GM Tab Take 1 tablet by mouth every day. 90 tablet 3   • acyclovir (ZOVIRAX) 200 MG Cap Take 2 Capsules by mouth 3 times a day as needed. 60 capsule 0   • Probiotic Product (PROBIOTIC DAILY PO) Take  by mouth.     • Multiple Vitamins-Minerals (MULTIPLE VITAMINS/WOMENS PO) Take  by mouth.     • FOLIC ACID PO Take 1 Each by mouth every day at 6 PM.     • BIOTIN PO Take  by mouth.     • Cholecalciferol (VITAMIN D) 2000 UNITS CAPS Take 1 Cap by mouth.     • estradiol (ESTRACE) 1 MG TABS Take 1 mg by mouth every day. Takes 1/2 tab every other day       No current facility-administered medications for this visit.       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   • Arthralgia of hip 03/11/2020   • Diverticulosis 03/11/2020   • LLQ abdominal pain 02/05/2020   • Bloody stool 02/05/2020   • Left-sided chest wall pain 02/05/2020   • Skin plaque 09/23/2019   • Herpes simplex infection of skin 09/23/2019   • Hormone replacement therapy (HRT) 09/23/2019   • Anxiety 04/27/2017   • Screening for breast cancer 10/25/2016   • Herpes simplex 10/03/2014   • Hypertension 07/09/2012        Objective:   /76 (BP Location: Left arm, Patient Position: Sitting, BP Cuff Size: Adult)   Pulse 63   Temp 37 °C (98.6 °F) (Temporal)   Ht 1.575 m (5' 2\") Comment: Pt  Wt 49.2 kg (108 lb 8 oz)   LMP 10/25/2002   Breastfeeding No   BMI 19.84 kg/m²     Physical Exam:  Constitutional: Alert, no distress, well-groomed.  Skin: No rashes in visible areas.  Eye: Round. Conjunctiva clear, lids normal. No icterus.   ENMT: Lips pink without lesions, good dentition, moist mucous membranes. Phonation normal.  Neck: No masses, no thyromegaly. Moves freely without pain.  Respiratory: Unlabored respiratory effort, no cough or audible wheeze  Psych: Alert and oriented x3, normal affect and mood.     Assessment and Plan:   The following treatment plan was discussed:     1. Viral URI  - cephALEXin (KEFLEX) 500 MG Cap; Take 2 Capsules by mouth 2 times a day for 10 days.  Dispense: 40 Capsule; Refill: 0  - MONONUCLEOSIS TEST QUAL; Future    2. Sore throat  - cephALEXin (KEFLEX) 500 MG Cap; Take 2 Capsules by mouth 2 times a day for 10 days.  Dispense: 40 Capsule; Refill: 0  - MONONUCLEOSIS TEST QUAL; Future    3. Primary hypertension    4. Hypertension, unspecified type  - Basic Metabolic Panel; Future  - Lipid Profile; Future  - MICROALBUMIN CREAT RATIO URINE; Future    Problem List Items Addressed This Visit     Hypertension     At goal now   Plan for 6 mo follow up labs prior                Relevant Orders    Basic Metabolic Panel    Lipid Profile "    MICROALBUMIN CREAT RATIO URINE    Viral URI     [facility] to [facility]   Strep neg   covid neg               Relevant Medications    cephALEXin (KEFLEX) 500 MG Cap    Other Relevant Orders    MONONUCLEOSIS TEST QUAL    Sore throat     Neg rapid strep and neg rapid covid   Neg covid PCR   She would like trial of abx   Not unreasonable, some note of exudates on exam in UC , poct not 100% sensitive   I do warn her that if this is mono (viral) she may break out in classic rash   And orders for this are sent in as well     Symptomatic and supportive care measures reviewed     20+ min spent                Relevant Medications    cephALEXin (KEFLEX) 500 MG Cap    Other Relevant Orders    MONONUCLEOSIS TEST QUAL          Follow-up: No follow-ups on file.            Complex Repair Preamble Text (Leave Blank If You Do Not Want): Extensive wide undermining was performed.

## 2023-03-23 ENCOUNTER — OFFICE VISIT (OUTPATIENT)
Dept: MEDICAL GROUP | Facility: MEDICAL CENTER | Age: 68
End: 2023-03-23
Payer: MEDICARE

## 2023-03-23 VITALS
HEIGHT: 62 IN | SYSTOLIC BLOOD PRESSURE: 124 MMHG | TEMPERATURE: 97.6 F | RESPIRATION RATE: 16 BRPM | WEIGHT: 110.23 LBS | DIASTOLIC BLOOD PRESSURE: 76 MMHG | OXYGEN SATURATION: 96 % | HEART RATE: 65 BPM | BODY MASS INDEX: 20.28 KG/M2

## 2023-03-23 DIAGNOSIS — Z13.6 SCREENING FOR ISCHEMIC HEART DISEASE: ICD-10-CM

## 2023-03-23 DIAGNOSIS — I10 PRIMARY HYPERTENSION: ICD-10-CM

## 2023-03-23 DIAGNOSIS — Z13.1 SCREENING FOR DIABETES MELLITUS (DM): ICD-10-CM

## 2023-03-23 PROCEDURE — 99214 OFFICE O/P EST MOD 30 MIN: CPT | Performed by: FAMILY MEDICINE

## 2023-03-23 NOTE — ASSESSMENT & PLAN NOTE
Improved  Labs reviewed in detail   Continue losartan   Continue home blood pressure log     30+ min spent     Continue q6 mo visits and labs

## 2023-04-13 RX ORDER — HYDROCHLOROTHIAZIDE 12.5 MG/1
TABLET ORAL
Qty: 90 TABLET | Refills: 3 | Status: SHIPPED | OUTPATIENT
Start: 2023-04-13 | End: 2023-09-05 | Stop reason: SDUPTHER

## 2023-04-26 ENCOUNTER — TELEPHONE (OUTPATIENT)
Dept: HEALTH INFORMATION MANAGEMENT | Facility: OTHER | Age: 68
End: 2023-04-26
Payer: MEDICARE

## 2023-06-12 DIAGNOSIS — I10 ESSENTIAL HYPERTENSION: ICD-10-CM

## 2023-06-13 RX ORDER — LOSARTAN POTASSIUM 100 MG/1
100 TABLET ORAL
Qty: 100 TABLET | Refills: 0 | Status: SHIPPED | OUTPATIENT
Start: 2023-06-13 | End: 2023-09-05 | Stop reason: SDUPTHER

## 2023-06-23 DIAGNOSIS — B02.9 HERPES ZOSTER WITHOUT COMPLICATION: ICD-10-CM

## 2023-06-23 RX ORDER — VALACYCLOVIR HYDROCHLORIDE 1 G/1
1000 TABLET, FILM COATED ORAL DAILY
Qty: 90 TABLET | Refills: 3 | Status: SHIPPED | OUTPATIENT
Start: 2023-06-23

## 2023-07-20 ENCOUNTER — HOSPITAL ENCOUNTER (OUTPATIENT)
Dept: RADIOLOGY | Facility: MEDICAL CENTER | Age: 68
End: 2023-07-20
Attending: FAMILY MEDICINE
Payer: MEDICARE

## 2023-07-20 DIAGNOSIS — Z12.31 VISIT FOR SCREENING MAMMOGRAM: ICD-10-CM

## 2023-07-20 PROCEDURE — 77063 BREAST TOMOSYNTHESIS BI: CPT

## 2023-07-21 ENCOUNTER — APPOINTMENT (OUTPATIENT)
Dept: MEDICAL GROUP | Facility: MEDICAL CENTER | Age: 68
End: 2023-07-21
Payer: MEDICARE

## 2023-08-14 ENCOUNTER — HOSPITAL ENCOUNTER (OUTPATIENT)
Dept: LAB | Facility: MEDICAL CENTER | Age: 68
End: 2023-08-14
Attending: FAMILY MEDICINE
Payer: MEDICARE

## 2023-08-14 DIAGNOSIS — Z13.1 SCREENING FOR DIABETES MELLITUS (DM): ICD-10-CM

## 2023-08-14 DIAGNOSIS — Z13.6 SCREENING FOR ISCHEMIC HEART DISEASE: ICD-10-CM

## 2023-08-14 DIAGNOSIS — I10 PRIMARY HYPERTENSION: ICD-10-CM

## 2023-08-14 LAB
ANION GAP SERPL CALC-SCNC: 11 MMOL/L (ref 7–16)
BUN SERPL-MCNC: 18 MG/DL (ref 8–22)
CALCIUM SERPL-MCNC: 9.6 MG/DL (ref 8.5–10.5)
CHLORIDE SERPL-SCNC: 104 MMOL/L (ref 96–112)
CHOLEST SERPL-MCNC: 207 MG/DL (ref 100–199)
CO2 SERPL-SCNC: 25 MMOL/L (ref 20–33)
CREAT SERPL-MCNC: 0.53 MG/DL (ref 0.5–1.4)
CREAT UR-MCNC: 91.32 MG/DL
FASTING STATUS PATIENT QL REPORTED: NORMAL
GFR SERPLBLD CREATININE-BSD FMLA CKD-EPI: 101 ML/MIN/1.73 M 2
GLUCOSE SERPL-MCNC: 95 MG/DL (ref 65–99)
HDLC SERPL-MCNC: 120 MG/DL
LDLC SERPL CALC-MCNC: 78 MG/DL
MICROALBUMIN UR-MCNC: <1.2 MG/DL
MICROALBUMIN/CREAT UR: NORMAL MG/G (ref 0–30)
POTASSIUM SERPL-SCNC: 4 MMOL/L (ref 3.6–5.5)
SODIUM SERPL-SCNC: 140 MMOL/L (ref 135–145)
TRIGL SERPL-MCNC: 47 MG/DL (ref 0–149)

## 2023-08-14 PROCEDURE — 36415 COLL VENOUS BLD VENIPUNCTURE: CPT

## 2023-08-14 PROCEDURE — 80061 LIPID PANEL: CPT

## 2023-08-14 PROCEDURE — 80048 BASIC METABOLIC PNL TOTAL CA: CPT

## 2023-08-14 PROCEDURE — 82043 UR ALBUMIN QUANTITATIVE: CPT

## 2023-08-14 PROCEDURE — 82570 ASSAY OF URINE CREATININE: CPT

## 2023-09-05 ENCOUNTER — TELEMEDICINE (OUTPATIENT)
Dept: MEDICAL GROUP | Facility: MEDICAL CENTER | Age: 68
End: 2023-09-05
Payer: MEDICARE

## 2023-09-05 VITALS
HEIGHT: 61 IN | WEIGHT: 107.6 LBS | BODY MASS INDEX: 20.32 KG/M2 | SYSTOLIC BLOOD PRESSURE: 121 MMHG | DIASTOLIC BLOOD PRESSURE: 76 MMHG

## 2023-09-05 DIAGNOSIS — I10 HYPERTENSION, UNSPECIFIED TYPE: ICD-10-CM

## 2023-09-05 DIAGNOSIS — Z79.890 HORMONE REPLACEMENT THERAPY (HRT): ICD-10-CM

## 2023-09-05 DIAGNOSIS — U07.1 COVID-19: ICD-10-CM

## 2023-09-05 DIAGNOSIS — I10 ESSENTIAL HYPERTENSION: ICD-10-CM

## 2023-09-05 PROCEDURE — 99214 OFFICE O/P EST MOD 30 MIN: CPT | Mod: 95 | Performed by: FAMILY MEDICINE

## 2023-09-05 RX ORDER — LOSARTAN POTASSIUM 100 MG/1
100 TABLET ORAL
Qty: 100 TABLET | Refills: 2 | Status: SHIPPED | OUTPATIENT
Start: 2023-09-05

## 2023-09-05 RX ORDER — HYDROCHLOROTHIAZIDE 12.5 MG/1
12.5 TABLET ORAL
Qty: 90 TABLET | Refills: 3 | Status: SHIPPED | OUTPATIENT
Start: 2023-09-05

## 2023-09-05 NOTE — ASSESSMENT & PLAN NOTE
Symptoms began and tested + about 7 days ago on the 29th   [facility] recommendations for isolation and masking discussed   Symptomatic and supportive care measures reviewed

## 2023-09-05 NOTE — ASSESSMENT & PLAN NOTE
I review our recommendations to avoid /wean off HRT by age 60 and avoid exogenous estrogen in the setting of HTN      She is motivated to wean off     Follow up with tessa julian for dose adjustment of htn meds if her bp improves off exogenous estrogen     30+ min spent

## 2023-09-05 NOTE — PROGRESS NOTES
This medical record contains text that has been entered with the assistance of computer voice recognition and dictation software.  Therefore, it may contain unintended errors in text, spelling, punctuation, or grammar      Virtual Visit: Established Patient   This visit was conducted via Zoom using secure and encrypted videoconferencing technology.   The patient was in their home in the [facility].    The patient's identity was confirmed and verbal consent was obtained for this virtual visit.       Chief Complaint   Patient presents with    Coronavirus Screening     Tested positive 08/29, 09/04       Praveena Landers is a 68 y.o. female here evaluation and management of:     Current Outpatient Medications   Medication Sig Dispense Refill    losartan (COZAAR) 100 MG Tab Take 1 Tablet by mouth every day. 100 Tablet 2    hydroCHLOROthiazide (HYDRODIURIL) 12.5 MG tablet Take 1 Tablet by mouth every day. 90 Tablet 3    valacyclovir (VALTREX) 1 GM Tab TAKE 1 TABLET BY MOUTH EVERY DAY 90 Tablet 3    Fluticasone Propionate (FLONASE NA) Administer 1 mg into affected nostril(S) 1 time a day as needed (As needed).      acyclovir (ZOVIRAX) 200 MG Cap Take 2 Capsules by mouth 3 times a day as needed. 60 capsule 0    Probiotic Product (PROBIOTIC DAILY PO) Take  by mouth.      Multiple Vitamins-Minerals (MULTIPLE VITAMINS/WOMENS PO) Take  by mouth.      FOLIC ACID PO Take 1 Each by mouth every day at 6 PM.      BIOTIN PO Take  by mouth.      Cholecalciferol (VITAMIN D) 2000 UNITS CAPS Take 1 Cap by mouth.       No current facility-administered medications for this visit.     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    Arthralgia of hip 03/11/2020    Diverticulosis 03/11/2020    LLQ abdominal pain 02/05/2020    Bloody stool 02/05/2020    Left-sided chest wall pain 02/05/2020    Skin plaque 09/23/2019    Herpes simplex infection of skin 09/23/2019    Hormone replacement therapy (HRT) 09/23/2019    Anxiety 04/27/2017    Screening for breast cancer 10/25/2016    Herpes simplex 10/03/2014    Hypertension 07/09/2012     Past Surgical History:   Procedure Laterality Date    ABDOMINAL HYSTERECTOMY TOTAL  01/01/2001    partial, precancer cervix    BREAST BIOPSY Right 1975    excisional [id number]    WRIST EXPLORATION      Right    WRIST EXPLORATION Right     cyst removal      Social History     Tobacco Use    Smoking status: Never    Smokeless tobacco: Never   Vaping Use    Vaping Use: Never used   Substance Use Topics    Alcohol use: Yes     Comment: 1gl wine daily    Drug use: No     Family History   Problem Relation Age of Onset    Cancer Mother         breast    Cancer Father         lung    Hypertension Father     Diabetes Paternal Uncle     Cancer Sister 62        colon cancer    Heart Disease Neg Hx     Stroke Neg Hx            ROS    all review of system completed and negative except for those listed above     Objective:     /76   Ht 1.549 m (5' 1\")   Wt 48.8 kg (107 lb 9.6 oz)  Body mass index is 20.33 kg/m².  Physical Exam:    Constitutional: Alert, no distress.  Skin: Warm, dry, good turgor, no rashes in visible areas.  Eye: Equal, round and reactive, conjunctiva clear, lids normal.  ENMT: Lips without lesions, good dentition, oropharynx clear.  Neck: Trachea midline, no masses, no thyromegaly. No cervical or supraclavicular lymphadenopathy.  Respiratory: Unlabored respiratory effort, lungs clear to auscultation, no wheezes, no ronchi.  Cardiovascular: Normal S1, S2, no murmur, no edema.  Abdomen: Soft, non-tender, no masses, no " hepatosplenomegaly.  Psych: Alert and oriented x3, normal affect and mood.          Assessment and Plan:   The following treatment plan was discussed        Problem List Items Addressed This Visit       Hypertension     At goal labs reviewed   Continue current txt              Relevant Medications    losartan (COZAAR) 100 MG Tab    hydroCHLOROthiazide (HYDRODIURIL) 12.5 MG tablet    Hormone replacement therapy (HRT)     I review our recommendations to avoid /wean off HRT by age 60 and avoid exogenous estrogen in the setting of HTN      She is motivated to wean off     Follow up with tessa julian for dose adjustment of htn meds if her bp improves off exogenous estrogen     30+ min spent          COVID-19     Symptoms began and tested + about 7 days ago on the 29th   [facility] recommendations for isolation and masking discussed   Symptomatic and supportive care measures reviewed               Other Visit Diagnoses       Essential hypertension        Relevant Medications    losartan (COZAAR) 100 MG Tab    hydroCHLOROthiazide (HYDRODIURIL) 12.5 MG tablet                  Instructed to follow up if symptoms worsen or fail to improve, ER/UC precautions discussed as well    Alexus Nicole MD  [facility], Family Medicine   [address] [place] [address]   [place] [id number]  Phone: [phone]

## 2024-03-19 ENCOUNTER — TELEPHONE (OUTPATIENT)
Dept: HEALTH INFORMATION MANAGEMENT | Facility: OTHER | Age: 69
End: 2024-03-19

## 2024-06-22 DIAGNOSIS — B02.9 HERPES ZOSTER WITHOUT COMPLICATION: ICD-10-CM

## 2024-06-24 NOTE — TELEPHONE ENCOUNTER
Received request via: Pharmacy    Was the patient seen in the last year in this department? Yes    Does the patient have an active prescription (recently filled or refills available) for medication(s) requested? No    Pharmacy Name: [facility]    Does the patient have correction Plus and need 100 day supply (blood pressure, diabetes and cholesterol meds only)? Medication is not for cholesterol, blood pressure or diabetes

## 2024-06-25 RX ORDER — VALACYCLOVIR HYDROCHLORIDE 1 G/1
1000 TABLET, FILM COATED ORAL DAILY
Qty: 90 TABLET | Refills: 0 | Status: SHIPPED | OUTPATIENT
Start: 2024-06-25

## 2024-06-26 DIAGNOSIS — I10 ESSENTIAL HYPERTENSION: ICD-10-CM

## 2024-06-26 NOTE — TELEPHONE ENCOUNTER
Was the patient seen in the last year in this department? Yes   Does patient have an active prescription for medications requested? No   Received Request Via: Pharmacy       [Follow-up Visit ___] : a follow-up visit  for [unfilled] rolling walker

## 2024-06-27 RX ORDER — LOSARTAN POTASSIUM 100 MG/1
100 TABLET ORAL
Qty: 100 TABLET | Refills: 0 | Status: SHIPPED | OUTPATIENT
Start: 2024-06-27

## 2024-07-29 ENCOUNTER — HOSPITAL ENCOUNTER (OUTPATIENT)
Dept: RADIOLOGY | Facility: MEDICAL CENTER | Age: 69
End: 2024-07-29
Attending: FAMILY MEDICINE
Payer: MEDICARE

## 2024-07-29 DIAGNOSIS — Z12.31 VISIT FOR SCREENING MAMMOGRAM: ICD-10-CM

## 2024-07-29 PROCEDURE — 77067 SCR MAMMO BI INCL CAD: CPT

## 2024-08-06 PROBLEM — J06.9 VIRAL URI: Status: RESOLVED | Noted: 2022-05-26 | Resolved: 2024-08-06

## 2024-08-06 PROBLEM — J02.9 SORE THROAT: Status: RESOLVED | Noted: 2022-05-26 | Resolved: 2024-08-06

## 2024-08-06 PROBLEM — K62.5 BRBPR (BRIGHT RED BLOOD PER RECTUM): Status: RESOLVED | Noted: 2020-03-31 | Resolved: 2024-08-06

## 2024-08-06 PROBLEM — B00.9 HERPES SIMPLEX INFECTION OF SKIN: Status: RESOLVED | Noted: 2019-09-23 | Resolved: 2024-08-06

## 2024-08-06 PROBLEM — H92.01 RIGHT EAR PAIN: Status: RESOLVED | Noted: 2022-04-21 | Resolved: 2024-08-06

## 2024-08-06 PROBLEM — U07.1 COVID-19: Status: RESOLVED | Noted: 2023-09-05 | Resolved: 2024-08-06

## 2024-08-06 PROBLEM — K92.1 BLOODY STOOL: Status: RESOLVED | Noted: 2020-02-05 | Resolved: 2024-08-06

## 2024-08-06 ASSESSMENT — ACTIVITIES OF DAILY LIVING (ADL): BATHING_REQUIRES_ASSISTANCE: 0

## 2024-08-06 ASSESSMENT — PATIENT HEALTH QUESTIONNAIRE - PHQ9
2. FEELING DOWN, DEPRESSED, IRRITABLE, OR HOPELESS: SEVERAL DAYS
1. LITTLE INTEREST OR PLEASURE IN DOING THINGS: SEVERAL DAYS

## 2024-08-06 ASSESSMENT — ENCOUNTER SYMPTOMS: GENERAL WELL-BEING: GOOD

## 2024-08-06 NOTE — ASSESSMENT & PLAN NOTE
Chronic, stable. BP today 120/70. Pt monitors BP at home and reports it runs around 120 systolic. Denies dizziness/lightheadedness, chest pain, dyspnea. Continue current treatment regime: HCTZ 12.5mg daily, losartan 100mg daily. Follow up with PCP annually for continued monitoring and management.

## 2024-08-07 ENCOUNTER — APPOINTMENT (OUTPATIENT)
Dept: FAMILY PLANNING/WOMEN'S HEALTH CLINIC | Facility: PHYSICIAN GROUP | Age: 69
End: 2024-08-07
Attending: FAMILY MEDICINE
Payer: MEDICARE

## 2024-08-07 VITALS
SYSTOLIC BLOOD PRESSURE: 120 MMHG | DIASTOLIC BLOOD PRESSURE: 70 MMHG | HEIGHT: 62 IN | WEIGHT: 109 LBS | BODY MASS INDEX: 20.06 KG/M2

## 2024-08-07 DIAGNOSIS — F41.9 ANXIETY: ICD-10-CM

## 2024-08-07 DIAGNOSIS — M85.89 OSTEOPENIA OF MULTIPLE SITES: ICD-10-CM

## 2024-08-07 DIAGNOSIS — Z23 NEED FOR VACCINATION: ICD-10-CM

## 2024-08-07 DIAGNOSIS — F32.9 REACTIVE DEPRESSION (SITUATIONAL): ICD-10-CM

## 2024-08-07 DIAGNOSIS — I10 HYPERTENSION, UNSPECIFIED TYPE: ICD-10-CM

## 2024-08-07 PROBLEM — M85.80 OSTEOPENIA: Status: ACTIVE | Noted: 2024-08-07

## 2024-08-07 PROCEDURE — 90677 PCV20 VACCINE IM: CPT | Performed by: FAMILY MEDICINE

## 2024-08-07 PROCEDURE — 3074F SYST BP LT 130 MM HG: CPT | Performed by: PHYSICIAN ASSISTANT

## 2024-08-07 PROCEDURE — 3078F DIAST BP <80 MM HG: CPT | Performed by: PHYSICIAN ASSISTANT

## 2024-08-07 PROCEDURE — 1126F AMNT PAIN NOTED NONE PRSNT: CPT | Performed by: PHYSICIAN ASSISTANT

## 2024-08-07 PROCEDURE — 90471 IMMUNIZATION ADMIN: CPT | Performed by: FAMILY MEDICINE

## 2024-08-07 PROCEDURE — G0439 PPPS, SUBSEQ VISIT: HCPCS | Mod: 25 | Performed by: PHYSICIAN ASSISTANT

## 2024-08-07 SDOH — ECONOMIC STABILITY: FOOD INSECURITY: WITHIN THE PAST 12 MONTHS, THE FOOD YOU BOUGHT JUST DIDN'T LAST AND YOU DIDN'T HAVE MONEY TO GET MORE.: NEVER TRUE

## 2024-08-07 SDOH — ECONOMIC STABILITY: INCOME INSECURITY: HOW HARD IS IT FOR YOU TO PAY FOR THE VERY BASICS LIKE FOOD, HOUSING, MEDICAL CARE, AND HEATING?: NOT HARD AT ALL

## 2024-08-07 SDOH — ECONOMIC STABILITY: FOOD INSECURITY: WITHIN THE PAST 12 MONTHS, YOU WORRIED THAT YOUR FOOD WOULD RUN OUT BEFORE YOU GOT MONEY TO BUY MORE.: NEVER TRUE

## 2024-08-07 ASSESSMENT — PAIN SCALES - GENERAL: PAINLEVEL: NO PAIN

## 2024-08-07 ASSESSMENT — PATIENT HEALTH QUESTIONNAIRE - PHQ9
CLINICAL INTERPRETATION OF PHQ2 SCORE: 2
SUM OF ALL RESPONSES TO PHQ QUESTIONS 1-9: 5
5. POOR APPETITE OR OVEREATING: 1 - SEVERAL DAYS

## 2024-08-07 NOTE — PROGRESS NOTES
Comprehensive Health Assessment Program     Praveena Landers is a 69 y.o. here for her comprehensive health assessment.    Patient Active Problem List    Diagnosis Date Noted    Reactive depression (situational) 08/07/2024    Osteopenia 08/07/2024    BMI 21.0-21.9, adult 05/25/2023    Right hip pain 08/09/2022    Medicare annual wellness visit, subsequent 07/14/2021    Hip pain, acute, right 07/07/2020    Other constipation 03/31/2020    Intercostal neuritis 03/11/2020    Arthralgia of hip 03/11/2020    Diverticulosis 03/11/2020    LLQ abdominal pain 02/05/2020    Left-sided chest wall pain 02/05/2020    Skin plaque 09/23/2019    Hormone replacement therapy (HRT) 09/23/2019    Anxiety 04/27/2017    Herpes simplex 10/03/2014    Hypertension 07/09/2012       Current Outpatient Medications   Medication Sig Dispense Refill    losartan (COZAAR) 100 MG Tab TAKE 1 TABLET BY MOUTH EVERY  Tablet 0    hydroCHLOROthiazide (HYDRODIURIL) 12.5 MG tablet Take 1 Tablet by mouth every day. 90 Tablet 3    Fluticasone Propionate (FLONASE NA) Administer 1 mg into affected nostril(S) 1 time a day as needed (As needed).      acyclovir (ZOVIRAX) 200 MG Cap Take 2 Capsules by mouth 3 times a day as needed. 60 capsule 0    Probiotic Product (PROBIOTIC DAILY PO) Take  by mouth.      Multiple Vitamins-Minerals (MULTIPLE VITAMINS/WOMENS PO) Take  by mouth.      FOLIC ACID PO Take 1 Each by mouth every day at 6 PM.      BIOTIN PO Take  by mouth.      Cholecalciferol (VITAMIN D) 2000 UNITS CAPS Take 1 Cap by mouth.       No current facility-administered medications for this visit.          Current supplements as per medication list.     Allergies:   Amlodipine, Benazepril, and Penicillins  Social History     Tobacco Use    Smoking status: Never    Smokeless tobacco: Never   Vaping Use    Vaping status: Never Used   Substance Use Topics    Alcohol use: Yes     Alcohol/week: 4.2 oz     Types: 7 Glasses of wine per week     Comment:  1gl wine daily    Drug use: No     Family History   Problem Relation Age of Onset    Cancer Mother         breast    Cancer Father         lung    Hypertension Father     Diabetes Paternal Uncle     Cancer Sister 62        colon cancer    Heart Disease Neg Hx     Stroke Neg Hx      Praveena  has a past medical history of Bloody stool (02/05/2020), COVID-19 (09/05/2023), GERD (gastroesophageal reflux disease) (10/03/2014), Herpes simplex, and Hypertension.   Past Surgical History:   Procedure Laterality Date    ABDOMINAL HYSTERECTOMY TOTAL  01/01/2001    partial, precancer cervix    BREAST BIOPSY Right 1975    excisional bx-B9    WRIST EXPLORATION      Right    WRIST EXPLORATION Right     cyst removal       Screening:  In the last six months have you experienced any leakage of urine? No    Depression Screening  Little interest or pleasure in doing things?  1 - several days  Feeling down, depressed , or hopeless? 1 - several days  Trouble falling or staying asleep, or sleeping too much?  1 - several days  Feeling tired or having little energy?  0 - not at all  Poor appetite or overeating?  1 - several days  Feeling bad about yourself - or that you are a failure or have let yourself or your family down? 1 - several days  Trouble concentrating on things, such as reading the newspaper or watching television? 0 - not at all  Moving or speaking so slowly that other people could have noticed.  Or the opposite - being so fidgety or restless that you have been moving around a lot more than usual?  0 - not at all  Thoughts that you would be better off dead, or of hurting yourself?  0 - not at all  Patient Health Questionnaire Score: 5    If depressive symptoms identified deferred to follow up visit unless specifically addressed in assessment and plan.    Interpretation of PHQ-9 Total Score   Score Severity   1-4 No Depression   5-9 Mild Depression   10-14 Moderate Depression   15-19 Moderately Severe Depression   20-27 Severe  Depression    Screening for Cognitive Impairment  Do you or any of your friends or family members have any concern about your memory? No  Three Minute Recall (Leader, Season, Table) 3/3    Bill clock face with all 12 numbers and set the hands to show 10 minutes after 11.  Yes 5  Cognitive concerns identified deferred for follow up unless specifically addressed in assessment and plan.    Fall Risk Assessment  Has the patient had two or more falls in the last year or any fall with injury in the last year?  No    Safety Assessment  Do you always wear your seatbelt?  Yes  Any changes to home needed to function safely? No  Difficulty hearing.  No  Patient counseled about all safety risks that were identified.    Functional Assessment ADLs  Are there any barriers preventing you from cooking for yourself or meeting nutritional needs?  No.    Are there any barriers preventing you from driving safely or obtaining transportation?  No.    Are there any barriers preventing you from using a telephone or calling for help?  No    Are there any barriers preventing you from shopping?  No.    Are there any barriers preventing you from taking care of your own finances?  No    Are there any barriers preventing you from managing your medications?  No    Are there any barriers preventing you from showering, bathing or dressing yourself? No    Are there any barriers preventing you from doing housework or laundry? No    Are there any barriers preventing you from using the toilet?No    Are you currently engaging in any exercise or physical activity?  Yes. Walking her dog, moving her dog, going up and down the stairs. Walking 5 miles daily     Self-Assessment of Health  What is your perception of your health? Good    Do you sleep more than six hours a night? Yes    In the past 7 days, how much did pain keep you from doing your normal work? None    Do you spend quality time with family or friends (virtually or in person)? Yes    Do you  usually eat a heart healthy diet that constists of a variety of fruits, vegetables, whole grains and fiber? Yes    Do you eat foods high in fat and/or Fast Food more than three times per week? No    How concerned are you that your medical conditions are not being well managed? Not at all    Are you worried that in the next 2 months, you may not have stable housing that you own, rent, or stay in as part of a household? No        Advance Care Planning  Do you have an Advance Directive, Living Will, Durable Power of , or POLST? No, has documents at home                 Health Maintenance Summary            Overdue - IMM DTaP/Tdap/Td Vaccine (1 - Tdap) Never done      No completion history exists for this topic.              Overdue - Zoster (Shingles) Vaccines (2 of 2) Overdue since 12/14/2020      10/19/2020  Imm Admin: Zoster Vaccine Recombinant (RZV) (SHINGRIX)              Influenza Vaccine (1) Next due on 9/1/2024 11/11/2023  Outside Immunization: Influenza vaccine, quadrivalent, adjuvanted    11/19/2021  Imm Admin: Influenza, Unspecified - HISTORICAL DATA    10/19/2020  Imm Admin: Influenza Vaccine Quad Inj (Pf)    10/16/2019  Imm Admin: Influenza Vaccine Quad Inj (Pf)    11/08/2018  Imm Admin: Influenza Vaccine Quad Inj (Pf)    Only the first 5 history entries have been loaded, but more history exists.              Colorectal Cancer Screening (Colonoscopy - Every 5 Years) Tentatively due on 4/22/2025 04/22/2020  REFERRAL TO GI FOR COLONOSCOPY    05/26/2015  AMB REFERRAL TO GI FOR COLONOSCOPY              Annual Wellness Visit (Yearly) Next due on 8/7/2025 08/07/2024  Level of Service: ME ANNUAL WELLNESS VISIT-INCLUDES PPPS SUBSEQUE*    05/25/2023  Level of Service: ME ANNUAL WELLNESS VISIT-INCLUDES PPPS SUBSEQUE*    07/14/2021  Prob Dx: Medicare annual wellness visit, subsequent    07/14/2021  Visit Dx: Medicare annual wellness visit, subsequent              Mammogram (Every 2 Years) Next  due on 7/29/2026 07/29/2024  MA-SCREENING MAMMO BILAT W/TOMOSYNTHESIS W/CAD    07/20/2023  MA-SCREENING MAMMO BILAT W/TOMOSYNTHESIS W/CAD    05/12/2022  MA-SCREENING MAMMO BILAT W/TOMOSYNTHESIS W/CAD    10/10/2019  MA-SCREENING MAMMO BILAT W/TOMOSYNTHESIS W/CAD    02/17/2017  Done    Only the first 5 history entries have been loaded, but more history exists.              Bone Density Scan (Every 5 Years) Next due on 5/6/2027 05/06/2022  DS-BONE DENSITY STUDY (DEXA)    05/26/2009  DS-BONE DENSITY STUDY (DEXA)              Hepatitis C Screening  Tentatively Complete      03/16/2023  Hepatitis C Antibody component of HEP C VIRUS ANTIBODY              COVID-19 Vaccine (Series Information) Completed      11/11/2023  Imm Admin: Comirnaty (Covid-19 Vaccine, Mrna, 3654-9622 Formula)    11/19/2021  Imm Admin: PFIZER PURPLE CAP SARS-COV-2 VACCINATION (12+)    03/11/2021  Imm Admin: PFIZER PURPLE CAP SARS-COV-2 VACCINATION (12+)    02/18/2021  Imm Admin: PFIZER PURPLE CAP SARS-COV-2 VACCINATION (12+)              Pneumococcal Vaccine: 65+ Years (Series Information) Completed      08/07/2024  Imm Admin: Pneumococcal Conjugate Vaccine (PCV20)              Hepatitis A Vaccine (Hep A) (Series Information) Aged Out      No completion history exists for this topic.              Hepatitis B Vaccine (Hep B) (Series Information) Aged Out      No completion history exists for this topic.              HPV Vaccines (Series Information) Aged Out      No completion history exists for this topic.              Polio Vaccine (Inactivated Polio) (Series Information) Aged Out      No completion history exists for this topic.              Meningococcal Immunization (Series Information) Aged Out      No completion history exists for this topic.                    Patient Care Team:  Alexus Nicole M.D. as PCP - General (Family Medicine)    Financial Resource Strain: Low Risk  (8/7/2024)    Overall Financial Resource Strain (CARDIA)      "Difficulty of Paying Living Expenses: Not hard at all      Transportation Needs: No Transportation Needs (8/7/2024)    PRAPARE - Transportation     Lack of Transportation (Medical): No     Lack of Transportation (Non-Medical): No      Food Insecurity: No Food Insecurity (8/7/2024)    Hunger Vital Sign     Worried About Running Out of Food in the Last Year: Never true     Ran Out of Food in the Last Year: Never true        Encounter Vitals  Blood Pressure : 120/70  Weight: 49.4 kg (109 lb)  Height: 157.5 cm (5' 2\")  BMI (Calculated): 19.94  Pain Score: No pain     Alert, oriented in no acute distress.  Eye contact is good, speech goal directed, affect calm.    Assessment and Plan. The following treatment and monitoring plan is recommended:  Hypertension  Chronic, stable. BP today 120/70. Pt monitors BP at home and reports it runs around 120 systolic. Denies dizziness/lightheadedness, chest pain, dyspnea. Continue current treatment regime: HCTZ 12.5mg daily, losartan 100mg daily. Follow up with PCP annually for continued monitoring and management.     Anxiety  Chronic, ongoing. 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 She feels this flares up time to time but she feels she is coping well. Follow up with PCP at least annually for continued monitoring and management.    Osteopenia  Chronic, ongoing. Last DEXA 2022 with osteopenia of the lumbar spine and proximal left femur with T scores of -1.2 and -1.2 respectively. Denies hx of fragility fracture. Advise calcium and vitamin D supplementation with weightbearing exercises as tolerated. Follow up with PCP at least annually for continued monitoring and management.    Reactive depression (situational)  PHQ today 5/27, denying SI. Pt reports situational depression reactionary to events/triggers. She feels like she is coping well and otherwise considers herself to be very happy. Follow up with PCP as needed    Services suggested: No services needed at this time  Health Care Screening: " Age-appropriate preventive services recommended by USPTF and ACIP covered by Medicare were discussed today. Services ordered if indicated and agreed upon by the patient.  Referrals offered: Community-based lifestyle interventions to reduce health risks and promote self-management and wellness, fall prevention, nutrition, physical activity, tobacco-use cessation, weight loss, and mental health services as per orders if indicated.    Discussion today about general wellness and lifestyle habits:    Prevent falls and reduce trip hazards; Cautioned about securing or removing rugs.  Have a working fire alarm and carbon monoxide detector.  Engage in regular physical activity and social activities.    Follow-up: Return for follow up visit with PCP as previously scheduled.

## 2024-08-07 NOTE — ASSESSMENT & PLAN NOTE
Chronic, ongoing. Last DEXA 2022 with osteopenia of the lumbar spine and proximal left femur with T scores of -1.2 and -1.2 respectively. Denies hx of fragility fracture. Advise calcium and vitamin D supplementation with weightbearing exercises as tolerated. Follow up with PCP at least annually for continued monitoring and management.

## 2024-08-07 NOTE — ASSESSMENT & PLAN NOTE
Chronic, ongoing. 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 She feels this flares up time to time but she feels she is coping well. Follow up with PCP at least annually for continued monitoring and management.

## 2024-08-07 NOTE — ASSESSMENT & PLAN NOTE
PHQ today 5/27, denying SI. Pt reports situational depression reactionary to events/triggers. She feels like she is coping well and otherwise considers herself to be very happy. Follow up with PCP as needed

## 2024-08-30 SDOH — ECONOMIC STABILITY: FOOD INSECURITY: WITHIN THE PAST 12 MONTHS, YOU WORRIED THAT YOUR FOOD WOULD RUN OUT BEFORE YOU GOT MONEY TO BUY MORE.: NEVER TRUE

## 2024-08-30 SDOH — HEALTH STABILITY: PHYSICAL HEALTH: ON AVERAGE, HOW MANY DAYS PER WEEK DO YOU ENGAGE IN MODERATE TO STRENUOUS EXERCISE (LIKE A BRISK WALK)?: 4 DAYS

## 2024-08-30 SDOH — HEALTH STABILITY: MENTAL HEALTH
STRESS IS WHEN SOMEONE FEELS TENSE, NERVOUS, ANXIOUS, OR CAN'T SLEEP AT NIGHT BECAUSE THEIR MIND IS TROUBLED. HOW STRESSED ARE YOU?: TO SOME EXTENT

## 2024-08-30 SDOH — ECONOMIC STABILITY: INCOME INSECURITY: HOW HARD IS IT FOR YOU TO PAY FOR THE VERY BASICS LIKE FOOD, HOUSING, MEDICAL CARE, AND HEATING?: NOT VERY HARD

## 2024-08-30 SDOH — ECONOMIC STABILITY: FOOD INSECURITY: WITHIN THE PAST 12 MONTHS, THE FOOD YOU BOUGHT JUST DIDN'T LAST AND YOU DIDN'T HAVE MONEY TO GET MORE.: NEVER TRUE

## 2024-08-30 SDOH — ECONOMIC STABILITY: INCOME INSECURITY: IN THE LAST 12 MONTHS, WAS THERE A TIME WHEN YOU WERE NOT ABLE TO PAY THE MORTGAGE OR RENT ON TIME?: NO

## 2024-08-30 SDOH — HEALTH STABILITY: PHYSICAL HEALTH: ON AVERAGE, HOW MANY MINUTES DO YOU ENGAGE IN EXERCISE AT THIS LEVEL?: 30 MIN

## 2024-08-30 ASSESSMENT — SOCIAL DETERMINANTS OF HEALTH (SDOH)
IN A TYPICAL WEEK, HOW MANY TIMES DO YOU TALK ON THE PHONE WITH FAMILY, FRIENDS, OR NEIGHBORS?: THREE TIMES A WEEK
HOW MANY DRINKS CONTAINING ALCOHOL DO YOU HAVE ON A TYPICAL DAY WHEN YOU ARE DRINKING: 1 OR 2
HOW HARD IS IT FOR YOU TO PAY FOR THE VERY BASICS LIKE FOOD, HOUSING, MEDICAL CARE, AND HEATING?: NOT VERY HARD
IN THE PAST 12 MONTHS, HAS THE ELECTRIC, GAS, OIL, OR WATER COMPANY THREATENED TO SHUT OFF SERVICE IN YOUR HOME?: NO
HOW OFTEN DO YOU ATTEND CHURCH OR RELIGIOUS SERVICES?: NEVER
WITHIN THE PAST 12 MONTHS, YOU WORRIED THAT YOUR FOOD WOULD RUN OUT BEFORE YOU GOT THE MONEY TO BUY MORE: NEVER TRUE
HOW OFTEN DO YOU GET TOGETHER WITH FRIENDS OR RELATIVES?: THREE TIMES A WEEK
HOW OFTEN DO YOU HAVE A DRINK CONTAINING ALCOHOL: 2-3 TIMES A WEEK
HOW OFTEN DO YOU ATTEND CHURCH OR RELIGIOUS SERVICES?: NEVER
IN A TYPICAL WEEK, HOW MANY TIMES DO YOU TALK ON THE PHONE WITH FAMILY, FRIENDS, OR NEIGHBORS?: THREE TIMES A WEEK
HOW OFTEN DO YOU HAVE SIX OR MORE DRINKS ON ONE OCCASION: NEVER
HOW OFTEN DO YOU ATTENT MEETINGS OF THE CLUB OR ORGANIZATION YOU BELONG TO?: MORE THAN 4 TIMES PER YEAR
HOW OFTEN DO YOU ATTENT MEETINGS OF THE CLUB OR ORGANIZATION YOU BELONG TO?: MORE THAN 4 TIMES PER YEAR
HOW OFTEN DO YOU GET TOGETHER WITH FRIENDS OR RELATIVES?: THREE TIMES A WEEK

## 2024-08-30 ASSESSMENT — LIFESTYLE VARIABLES
HOW OFTEN DO YOU HAVE SIX OR MORE DRINKS ON ONE OCCASION: NEVER
SKIP TO QUESTIONS 9-10: 1
AUDIT-C TOTAL SCORE: 3
HOW MANY STANDARD DRINKS CONTAINING ALCOHOL DO YOU HAVE ON A TYPICAL DAY: 1 OR 2
HOW OFTEN DO YOU HAVE A DRINK CONTAINING ALCOHOL: 2-3 TIMES A WEEK

## 2024-09-03 ENCOUNTER — APPOINTMENT (OUTPATIENT)
Dept: MEDICAL GROUP | Facility: MEDICAL CENTER | Age: 69
End: 2024-09-03
Payer: MEDICARE

## 2024-09-03 VITALS
HEART RATE: 72 BPM | HEIGHT: 62 IN | SYSTOLIC BLOOD PRESSURE: 112 MMHG | WEIGHT: 112 LBS | RESPIRATION RATE: 16 BRPM | BODY MASS INDEX: 20.61 KG/M2 | TEMPERATURE: 97.9 F | DIASTOLIC BLOOD PRESSURE: 60 MMHG | OXYGEN SATURATION: 96 %

## 2024-09-03 DIAGNOSIS — L98.8 SKIN PLAQUE: ICD-10-CM

## 2024-09-03 DIAGNOSIS — B00.9 HSV (HERPES SIMPLEX VIRUS) INFECTION: ICD-10-CM

## 2024-09-03 DIAGNOSIS — Z00.00 ANNUAL PHYSICAL EXAM: ICD-10-CM

## 2024-09-03 DIAGNOSIS — I10 HYPERTENSION, UNSPECIFIED TYPE: ICD-10-CM

## 2024-09-03 DIAGNOSIS — R23.2 HOT FLASHES: ICD-10-CM

## 2024-09-03 DIAGNOSIS — Z23 NEED FOR VACCINATION: ICD-10-CM

## 2024-09-03 DIAGNOSIS — Z12.11 SCREENING FOR COLON CANCER: ICD-10-CM

## 2024-09-03 PROCEDURE — 99397 PER PM REEVAL EST PAT 65+ YR: CPT | Mod: 25 | Performed by: FAMILY MEDICINE

## 2024-09-03 PROCEDURE — 3078F DIAST BP <80 MM HG: CPT | Performed by: FAMILY MEDICINE

## 2024-09-03 PROCEDURE — 3074F SYST BP LT 130 MM HG: CPT | Performed by: FAMILY MEDICINE

## 2024-09-03 PROCEDURE — 90715 TDAP VACCINE 7 YRS/> IM: CPT | Performed by: FAMILY MEDICINE

## 2024-09-03 PROCEDURE — 99214 OFFICE O/P EST MOD 30 MIN: CPT | Mod: 25 | Performed by: FAMILY MEDICINE

## 2024-09-03 PROCEDURE — 90471 IMMUNIZATION ADMIN: CPT | Performed by: FAMILY MEDICINE

## 2024-09-03 RX ORDER — CLOBETASOL PROPIONATE 0.5 MG/G
1 OINTMENT TOPICAL 2 TIMES DAILY
Qty: 60 G | Refills: 0 | Status: SHIPPED | OUTPATIENT
Start: 2024-09-03 | End: 2024-09-17

## 2024-09-03 RX ORDER — ACYCLOVIR 400 MG/1
400 TABLET ORAL 2 TIMES DAILY
Qty: 180 TABLET | Refills: 3 | Status: SHIPPED | OUTPATIENT
Start: 2024-09-03

## 2024-09-03 RX ORDER — LOSARTAN POTASSIUM 50 MG/1
50 TABLET ORAL DAILY
Qty: 90 TABLET | Refills: 3 | Status: SHIPPED | OUTPATIENT
Start: 2024-09-03

## 2024-09-03 NOTE — PROGRESS NOTES
This medical record contains text that has been entered with the assistance of computer voice recognition and dictation software.  Therefore, it may contain unintended errors in text, spelling, punctuation, or grammar        Chief Complaint   Patient presents with    Annual Exam     Discuss Medications no longer covered by her insurance (Valacyclovir).,  Hot flashes  BP medications       Praveena Landers is a 69 y.o. female here evaluation and management of:       History of Present Illness  The patient presents for evaluation of multiple medical concerns.    She has been monitoring her blood pressure, which was recorded as 104/68 on her tracker watch. She is considering reducing her blood pressure medications, particularly losartan, which she takes at night. She experiences hot flashes during the night, which elevate her blood pressure and cause discomfort for about 25 to 30 minutes. She also notes that consuming cookies or bread before bedtime triggers these hot flashes.    She reports that valacyclovir is not covered by her Medicare. She has been using it as a preventative measure and has not experienced any outbreaks since starting this regimen.    She has been diagnosed with skin plaques on her elbows and feet, which worsen in hot weather. The condition appears to be pressure-related. She has been applying a steroid cream twice daily, which seems to alleviate the symptoms when used consistently. She is seeking a refill of this medication and a referral to a dermatologist.    She received the pneumonia vaccine approximately 4 weeks ago and plans to get the COVID-19 and influenza vaccines in October 2024. She is also scheduled to receive the shingles and tetanus vaccines. She is concerned about potential interactions between the antivirals and the tetanus vaccine.    Supplemental Information  She is trying to do her 8000 steps a day. She had a mammogram.        Current Outpatient Medications   Medication Sig  Dispense Refill    losartan (COZAAR) 50 MG Tab Take 1 Tablet by mouth every day. 90 Tablet 3    acyclovir (ZOVIRAX) 400 MG tablet Take 1 Tablet by mouth 2 times a day. 180 Tablet 3    clobetasol (TEMOVATE) 0.05 % Ointment Apply 1 Application topically 2 times a day for 14 days. 60 g 0    losartan (COZAAR) 100 MG Tab TAKE 1 TABLET BY MOUTH EVERY  Tablet 0    hydroCHLOROthiazide (HYDRODIURIL) 12.5 MG tablet Take 1 Tablet by mouth every day. 90 Tablet 3    Fluticasone Propionate (FLONASE NA) Administer 1 mg into affected nostril(S) 1 time a day as needed (As needed).      acyclovir (ZOVIRAX) 200 MG Cap Take 2 Capsules by mouth 3 times a day as needed. 60 capsule 0    Probiotic Product (PROBIOTIC DAILY PO) Take  by mouth.      Multiple Vitamins-Minerals (MULTIPLE VITAMINS/WOMENS PO) Take  by mouth.      FOLIC ACID PO Take 1 Each by mouth every day at 6 PM.      BIOTIN PO Take  by mouth.      Cholecalciferol (VITAMIN D) 2000 UNITS CAPS Take 1 Cap by mouth.       No current facility-administered medications for this visit.     Patient Active Problem List    Diagnosis Date Noted    Hot flashes 09/03/2024    Annual physical exam 09/03/2024    Reactive depression (situational) 08/07/2024    Osteopenia 08/07/2024    BMI 21.0-21.9, adult 05/25/2023    Right hip pain 08/09/2022    Medicare annual wellness visit, subsequent 07/14/2021    Hip pain, acute, right 07/07/2020    Other constipation 03/31/2020    Intercostal neuritis 03/11/2020    Arthralgia of hip 03/11/2020    Diverticulosis 03/11/2020    LLQ abdominal pain 02/05/2020    Left-sided chest wall pain 02/05/2020    Skin plaque 09/23/2019    Hormone replacement therapy (HRT) 09/23/2019    Anxiety 04/27/2017    Herpes simplex 10/03/2014    Hypertension 07/09/2012     Past Surgical History:   Procedure Laterality Date    ABDOMINAL HYSTERECTOMY TOTAL  01/01/2001    partial, precancer cervix    BREAST BIOPSY Right 1975    excisional bx-B9    WRIST EXPLORATION       "Right    WRIST EXPLORATION Right     cyst removal      Social History     Tobacco Use    Smoking status: Never    Smokeless tobacco: Never   Vaping Use    Vaping status: Never Used   Substance Use Topics    Alcohol use: Yes     Alcohol/week: 4.2 oz     Types: 7 Glasses of wine per week     Comment: 1gl wine daily    Drug use: No     Family History   Problem Relation Age of Onset    Cancer Mother         breast    Cancer Father         lung    Hypertension Father     Diabetes Paternal Uncle     Cancer Sister 62        colon cancer    Heart Disease Neg Hx     Stroke Neg Hx            ROS    all review of system completed and negative except for those listed above     Objective:     /60 (BP Location: Left arm, Patient Position: Sitting, BP Cuff Size: Adult)   Pulse 72   Temp 36.6 °C (97.9 °F) (Temporal)   Resp 16   Ht 1.575 m (5' 2\")   Wt 50.8 kg (112 lb)   SpO2 96%  Body mass index is 20.49 kg/m².  Physical Exam:        GEN: comfortable, alert and oriented, well nourished, well developed, in no apparent distress   HEENT: NCAT, eyes: pupils equal and reactive, sclera white, EOMIT, good dentition  HEART: limbs warm and well perfused, regular rate, no JVD, no lower extremity edema  LUNGS: speaking in full sentences, not in apparent respiratory distress, no audible wheezes  MSK: normal tone and bulk, no swelling of the joints, gait steady and normal       Assessment and Plan:   The following treatment plan was discussed        Problem List Items Addressed This Visit       Hypertension    Relevant Medications    losartan (COZAAR) 50 MG Tab    Skin plaque    Relevant Medications    clobetasol (TEMOVATE) 0.05 % Ointment    Other Relevant Orders    Referral to Dermatology    Hot flashes    Relevant Medications    losartan (COZAAR) 50 MG Tab    Other Relevant Orders    Referral to Gynecology    Annual physical exam     Age appropriate prev health care discussed   Cancer screening discussed   Vaccines discussed "   Labs offered   Blood pressure at goal     Anticipatory guidance including SPF and other skin protective measures, dental hygiene and care, regular exercise, diet, stress and family planning advice discussed.              Other Visit Diagnoses       Need for vaccination        Relevant Orders    Tdap Vaccine =>6YO IM    HSV (herpes simplex virus) infection        Relevant Medications    acyclovir (ZOVIRAX) 400 MG tablet    Screening for colon cancer        Relevant Orders    Referral to GI for Colonoscopy            Assessment & Plan  1. Hypertension.  Her blood pressure readings have been within the normal range, with occasional spikes likely due to stress or pain. The losartan dosage will be reduced to 50 mg. She is advised to monitor her blood pressure at home using an upper arm cuff a few times per week. A consultation with a gynecologist was recommended to discuss hormone replacement therapy, given her history of hypertension. She was also advised to limit her intake of cookies and bread before bedtime.    2. Herpes Simplex Virus.  A switch from valacyclovir to acyclovir 400 mg twice daily was made due to insurance coverage issues.    3. Skin Plaques.  The skin plaques on her extensor surfaces suggest a possible diagnosis of psoriasis. Her steroid cream prescription was renewed. A referral to a dermatologist was provided for further evaluation and management.    4. Health Maintenance.  She was advised to receive the COVID-19 and influenza vaccines in October 2024, and to schedule appointments for the shingles and tetanus vaccines. It was clarified that there would be no interaction between the antivirals and the tetanus vaccine.    Follow-up  The patient will follow up in 1 month.    Problem List Items Addressed This Visit       Hypertension    Relevant Medications    losartan (COZAAR) 50 MG Tab    Skin plaque    Relevant Medications    clobetasol (TEMOVATE) 0.05 % Ointment    Other Relevant Orders    Referral  to Dermatology    Hot flashes    Relevant Medications    losartan (COZAAR) 50 MG Tab    Other Relevant Orders    Referral to Gynecology    Annual physical exam     Age appropriate prev health care discussed   Cancer screening discussed   Vaccines discussed   Labs offered   Blood pressure at goal     Anticipatory guidance including SPF and other skin protective measures, dental hygiene and care, regular exercise, diet, stress and family planning advice discussed.              Other Visit Diagnoses       Need for vaccination        Relevant Orders    Tdap Vaccine =>8YO IM    HSV (herpes simplex virus) infection        Relevant Medications    acyclovir (ZOVIRAX) 400 MG tablet    Screening for colon cancer        Relevant Orders    Referral to GI for Colonoscopy              Instructed to follow up if symptoms worsen or fail to improve, ER/UC precautions discussed as well    Alexus Nicole MD  [facility], Family Medicine   [address] [place] [address] [address]   [place] [id number]  Phone: [phone]

## 2024-09-17 ENCOUNTER — PATIENT MESSAGE (OUTPATIENT)
Dept: MEDICAL GROUP | Facility: MEDICAL CENTER | Age: 69
End: 2024-09-17
Payer: MEDICARE

## 2024-09-17 DIAGNOSIS — Z13.1 SCREENING FOR DIABETES MELLITUS (DM): ICD-10-CM

## 2024-09-17 DIAGNOSIS — Z13.6 SCREENING FOR ISCHEMIC HEART DISEASE: ICD-10-CM

## 2024-10-02 DIAGNOSIS — I10 ESSENTIAL HYPERTENSION: ICD-10-CM

## 2024-10-03 RX ORDER — HYDROCHLOROTHIAZIDE 12.5 MG/1
12.5 TABLET ORAL
Qty: 100 TABLET | Refills: 3 | Status: SHIPPED | OUTPATIENT
Start: 2024-10-03

## 2024-10-03 RX ORDER — LOSARTAN POTASSIUM 100 MG/1
100 TABLET ORAL
Qty: 100 TABLET | Refills: 0 | Status: SHIPPED | OUTPATIENT
Start: 2024-10-03

## 2024-10-08 ENCOUNTER — HOSPITAL ENCOUNTER (OUTPATIENT)
Dept: LAB | Facility: MEDICAL CENTER | Age: 69
End: 2024-10-08
Attending: FAMILY MEDICINE
Payer: MEDICARE

## 2024-10-08 DIAGNOSIS — Z13.1 SCREENING FOR DIABETES MELLITUS (DM): ICD-10-CM

## 2024-10-08 DIAGNOSIS — Z13.6 SCREENING FOR ISCHEMIC HEART DISEASE: ICD-10-CM

## 2024-10-08 LAB
ANION GAP SERPL CALC-SCNC: 11 MMOL/L (ref 7–16)
BUN SERPL-MCNC: 20 MG/DL (ref 8–22)
CALCIUM SERPL-MCNC: 9.4 MG/DL (ref 8.5–10.5)
CHLORIDE SERPL-SCNC: 105 MMOL/L (ref 96–112)
CHOLEST SERPL-MCNC: 199 MG/DL (ref 100–199)
CO2 SERPL-SCNC: 27 MMOL/L (ref 20–33)
CREAT SERPL-MCNC: 0.73 MG/DL (ref 0.5–1.4)
FASTING STATUS PATIENT QL REPORTED: NORMAL
GFR SERPLBLD CREATININE-BSD FMLA CKD-EPI: 89 ML/MIN/1.73 M 2
GLUCOSE SERPL-MCNC: 111 MG/DL (ref 65–99)
HDLC SERPL-MCNC: 114 MG/DL
LDLC SERPL CALC-MCNC: 75 MG/DL
POTASSIUM SERPL-SCNC: 3.8 MMOL/L (ref 3.6–5.5)
SODIUM SERPL-SCNC: 143 MMOL/L (ref 135–145)
TRIGL SERPL-MCNC: 51 MG/DL (ref 0–149)

## 2024-10-08 PROCEDURE — 80048 BASIC METABOLIC PNL TOTAL CA: CPT

## 2024-10-08 PROCEDURE — 80061 LIPID PANEL: CPT

## 2024-10-08 PROCEDURE — 36415 COLL VENOUS BLD VENIPUNCTURE: CPT

## 2024-10-15 ENCOUNTER — OFFICE VISIT (OUTPATIENT)
Dept: MEDICAL GROUP | Facility: MEDICAL CENTER | Age: 69
End: 2024-10-15
Payer: MEDICARE

## 2024-10-15 VITALS
TEMPERATURE: 97.6 F | WEIGHT: 109.79 LBS | RESPIRATION RATE: 16 BRPM | OXYGEN SATURATION: 96 % | SYSTOLIC BLOOD PRESSURE: 100 MMHG | HEART RATE: 67 BPM | DIASTOLIC BLOOD PRESSURE: 60 MMHG | HEIGHT: 62 IN | BODY MASS INDEX: 20.2 KG/M2

## 2024-10-15 DIAGNOSIS — R73.09 ELEVATED GLUCOSE: ICD-10-CM

## 2024-10-15 DIAGNOSIS — I10 HYPERTENSION, UNSPECIFIED TYPE: ICD-10-CM

## 2024-10-15 DIAGNOSIS — R23.2 HOT FLASHES: ICD-10-CM

## 2024-10-15 PROCEDURE — 99214 OFFICE O/P EST MOD 30 MIN: CPT | Performed by: FAMILY MEDICINE

## 2024-10-15 PROCEDURE — 3078F DIAST BP <80 MM HG: CPT | Performed by: FAMILY MEDICINE

## 2024-10-15 PROCEDURE — 3074F SYST BP LT 130 MM HG: CPT | Performed by: FAMILY MEDICINE

## 2024-10-15 RX ORDER — ESTRADIOL 0.03 MG/D
1 FILM, EXTENDED RELEASE TRANSDERMAL DAILY
Qty: 8 PATCH | Refills: 3 | Status: SHIPPED | OUTPATIENT
Start: 2024-10-15 | End: 2024-10-27

## 2025-03-20 ENCOUNTER — HOSPITAL ENCOUNTER (OUTPATIENT)
Dept: LAB | Facility: MEDICAL CENTER | Age: 70
End: 2025-03-20
Attending: FAMILY MEDICINE
Payer: MEDICARE

## 2025-03-20 DIAGNOSIS — I10 HYPERTENSION, UNSPECIFIED TYPE: ICD-10-CM

## 2025-03-20 DIAGNOSIS — R73.09 ELEVATED GLUCOSE: ICD-10-CM

## 2025-03-20 LAB
ANION GAP SERPL CALC-SCNC: 9 MMOL/L (ref 7–16)
BUN SERPL-MCNC: 20 MG/DL (ref 8–22)
CALCIUM SERPL-MCNC: 9.2 MG/DL (ref 8.5–10.5)
CHLORIDE SERPL-SCNC: 105 MMOL/L (ref 96–112)
CHOLEST SERPL-MCNC: 216 MG/DL (ref 100–199)
CO2 SERPL-SCNC: 27 MMOL/L (ref 20–33)
CREAT SERPL-MCNC: 0.62 MG/DL (ref 0.5–1.4)
CREAT UR-MCNC: 221 MG/DL
EST. AVERAGE GLUCOSE BLD GHB EST-MCNC: 114 MG/DL
GFR SERPLBLD CREATININE-BSD FMLA CKD-EPI: 96 ML/MIN/1.73 M 2
GLUCOSE SERPL-MCNC: 113 MG/DL (ref 65–99)
HBA1C MFR BLD: 5.6 % (ref 4–5.6)
HDLC SERPL-MCNC: 122 MG/DL
LDLC SERPL CALC-MCNC: 84 MG/DL
MICROALBUMIN UR-MCNC: 1.9 MG/DL
MICROALBUMIN/CREAT UR: 9 MG/G (ref 0–30)
POTASSIUM SERPL-SCNC: 3.9 MMOL/L (ref 3.6–5.5)
SODIUM SERPL-SCNC: 141 MMOL/L (ref 135–145)
TRIGL SERPL-MCNC: 48 MG/DL (ref 0–149)

## 2025-03-20 PROCEDURE — 80048 BASIC METABOLIC PNL TOTAL CA: CPT

## 2025-03-20 PROCEDURE — 83036 HEMOGLOBIN GLYCOSYLATED A1C: CPT

## 2025-03-20 PROCEDURE — 82043 UR ALBUMIN QUANTITATIVE: CPT

## 2025-03-20 PROCEDURE — 80061 LIPID PANEL: CPT

## 2025-03-20 PROCEDURE — 36415 COLL VENOUS BLD VENIPUNCTURE: CPT

## 2025-03-20 PROCEDURE — 82570 ASSAY OF URINE CREATININE: CPT

## 2025-03-24 ENCOUNTER — PATIENT MESSAGE (OUTPATIENT)
Dept: HEALTH INFORMATION MANAGEMENT | Facility: OTHER | Age: 70
End: 2025-03-24

## 2025-04-15 ENCOUNTER — OFFICE VISIT (OUTPATIENT)
Dept: MEDICAL GROUP | Facility: MEDICAL CENTER | Age: 70
End: 2025-04-15
Payer: MEDICARE

## 2025-04-15 VITALS
OXYGEN SATURATION: 98 % | SYSTOLIC BLOOD PRESSURE: 120 MMHG | TEMPERATURE: 97.9 F | WEIGHT: 110.01 LBS | RESPIRATION RATE: 16 BRPM | HEART RATE: 70 BPM | DIASTOLIC BLOOD PRESSURE: 78 MMHG | BODY MASS INDEX: 20.12 KG/M2

## 2025-04-15 DIAGNOSIS — Z13.6 SCREENING FOR ISCHEMIC HEART DISEASE: ICD-10-CM

## 2025-04-15 DIAGNOSIS — R73.09 ELEVATED GLUCOSE: ICD-10-CM

## 2025-04-15 DIAGNOSIS — F43.9 STRESS: ICD-10-CM

## 2025-04-15 DIAGNOSIS — Z13.1 SCREENING FOR DIABETES MELLITUS (DM): ICD-10-CM

## 2025-04-15 PROCEDURE — 3078F DIAST BP <80 MM HG: CPT | Performed by: FAMILY MEDICINE

## 2025-04-15 PROCEDURE — 99213 OFFICE O/P EST LOW 20 MIN: CPT | Performed by: FAMILY MEDICINE

## 2025-04-15 PROCEDURE — 3074F SYST BP LT 130 MM HG: CPT | Performed by: FAMILY MEDICINE

## 2025-04-15 RX ORDER — ESTRADIOL 0.03 MG/D
FILM, EXTENDED RELEASE TRANSDERMAL
COMMUNITY
Start: 2025-04-09

## 2025-04-15 ASSESSMENT — PATIENT HEALTH QUESTIONNAIRE - PHQ9
5. POOR APPETITE OR OVEREATING: 0 - NOT AT ALL
SUM OF ALL RESPONSES TO PHQ QUESTIONS 1-9: 2
CLINICAL INTERPRETATION OF PHQ2 SCORE: 1

## 2025-04-15 ASSESSMENT — ANXIETY QUESTIONNAIRES
7. FEELING AFRAID AS IF SOMETHING AWFUL MIGHT HAPPEN: NEARLY EVERY DAY
1. FEELING NERVOUS, ANXIOUS, OR ON EDGE: SEVERAL DAYS
4. TROUBLE RELAXING: NEARLY EVERY DAY
5. BEING SO RESTLESS THAT IT IS HARD TO SIT STILL: NOT AT ALL
2. NOT BEING ABLE TO STOP OR CONTROL WORRYING: NEARLY EVERY DAY
3. WORRYING TOO MUCH ABOUT DIFFERENT THINGS: NEARLY EVERY DAY
GAD7 TOTAL SCORE: 16
6. BECOMING EASILY ANNOYED OR IRRITABLE: NEARLY EVERY DAY

## 2025-04-15 NOTE — PROGRESS NOTES
This medical record contains text that has been entered with the assistance of computer voice recognition and dictation software.  Therefore, it may contain unintended errors in text, spelling, punctuation, or grammar        Chief Complaint   Patient presents with    Annual Exam       Praveena Landers is a 70 y.o. female here evaluation and management of:       History of Present Illness  The patient presents for evaluation of anxiety, blood pressure management, and elevated blood glucose levels.    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 Actively seeking a replacement for her position as she contemplates [employer], her responsibilities include scheduling, managing disclosures of child abuse, data collection, recording, and reporting. 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 Collaborates with school counselors during disclosures to determine if it is a new disclosure or if the child has already been processed. School protocol is followed, and counselors report to CPS or juvenile services if necessary. Ensures the emotional wellbeing of her staff during disclosures. Despite good staff performance, managing daily operations is overwhelming. Significant anxiety related to work has been particularly challenging over the past 4 months, prompting consideration of mental health support and interest in finding a therapist within her network. Lives alone with her dog and maintains social interactions with neighbors.    Prescribed an estradiol patch, hot flashes have been alleviated, but sleep disturbances due to work-related anxiety persist. Tracking sleep patterns has shown improvement in sleep quality. A routine of waking up, cooling down, using the bathroom, hydrating, checking the heat, and breathing fresh air before returning to bed has been beneficial.    Expressing a  desire to reduce medication intake, she is considering discontinuing hydrochlorothiazide 12.5 mg while continuing losartan. Blood pressure is monitored throughout the day, typically measuring around 108/60.    Acknowledging dietary improvements are needed, she admits to consuming more carbohydrates and bread when under stress. Homemade ginger ale for her grandchildren contains sugar syrup.    She has not had any gynecological work in years and is scheduled for a tissue exam tomorrow.    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        Current Outpatient Medications   Medication Sig Dispense Refill    Estradiol 0.025 MG/24HR PATCH BIWEEKLY PLACE 1 PATCH TO CLEAN DRY SKIN TWICE WEEKLY      losartan (COZAAR) 100 MG Tab TAKE 1 TABLET BY MOUTH EVERY DAY (Patient not taking: Reported on 10/15/2024) 100 Tablet 0    losartan (COZAAR) 50 MG Tab Take 1 Tablet by mouth every day. 90 Tablet 3    acyclovir (ZOVIRAX) 400 MG tablet Take 1 Tablet by mouth 2 times a day. 180 Tablet 3    Fluticasone Propionate (FLONASE NA) Administer 1 mg into affected nostril(S) 1 time a day as needed (As needed).      acyclovir (ZOVIRAX) 200 MG Cap Take 2 Capsules by mouth 3 times a day as needed. (Patient not taking: Reported on 10/15/2024) 60 capsule 0    Probiotic Product (PROBIOTIC DAILY PO) Take  by mouth.      Multiple Vitamins-Minerals (MULTIPLE VITAMINS/WOMENS PO) Take  by mouth.      FOLIC ACID PO Take 1 Each by mouth every day at 6 PM.      BIOTIN PO Take  by mouth.      Cholecalciferol (VITAMIN D) 2000 UNITS CAPS Take 1 Cap by mouth.       No current facility-administered medications for this visit.     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    Arthralgia of hip 03/11/2020    Diverticulosis 03/11/2020    LLQ abdominal pain 02/05/2020    Left-sided chest wall pain 02/05/2020    Skin plaque 09/23/2019    Hormone replacement therapy (HRT) 09/23/2019    Anxiety 04/27/2017    Herpes simplex 10/03/2014    Hypertension 07/09/2012     Past Surgical History:   Procedure Laterality Date    ABDOMINAL HYSTERECTOMY TOTAL  01/01/2001    partial, precancer cervix    BREAST BIOPSY Right 1975    excisional [id number]    WRIST EXPLORATION      Right    WRIST EXPLORATION Right     cyst removal      Social History     Tobacco Use    Smoking status: Never    Smokeless tobacco: Never   Vaping Use    Vaping status: Never Used   Substance Use Topics    Alcohol use: Yes     Alcohol/week: 4.2 oz     Types: 7 Glasses of wine per week     Comment: 1gl wine daily    Drug use: No     Family History   Problem Relation Age of Onset    Cancer Mother         breast    Cancer Father         lung    Hypertension Father     Diabetes Paternal Uncle     Cancer Sister 62        colon cancer    Heart Disease Neg Hx     Stroke Neg Hx            ROS    all review of system completed and negative except for those listed above     Objective:     /78 (BP Location: Right arm, Patient Position: Sitting, BP Cuff Size: Adult)   Pulse 70   Temp 36.6 °C (97.9 °F) (Temporal)   Resp 16   Wt 49.9 kg (110 lb 0.2 oz)   SpO2 98%  Body mass index is 20.12 kg/m².  Physical Exam:        GEN: comfortable, alert and oriented, well nourished, well developed, in no apparent distress   HEENT: NCAT, eyes: pupils equal and reactive, sclera white, EOMIT, good dentition  HEART: limbs warm and well perfused, regular rate, no JVD, no lower extremity edema  LUNGS: speaking in full sentences, not in apparent respiratory distress, no audible wheezes  MSK: normal tone and bulk, no swelling of the joints, gait steady and normal        Assessment and Plan:   The following treatment plan was discussed        Assessment & Plan  Stress    Orders:    Referral to Behavioral Health    Elevated glucose    Orders:    CBC WITH DIFFERENTIAL; Future    Basic Metabolic Panel; Future    Lipid Profile; Future    Screening for diabetes mellitus (DM)    Orders:    CBC WITH DIFFERENTIAL; Future    Basic Metabolic Panel; Future    Lipid Profile; Future    Screening for ischemic heart disease    Orders:    CBC WITH DIFFERENTIAL; Future    Basic Metabolic Panel; Future    Lipid Profile; Future        Assessment & Plan  1. 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  Treatment plan: Referral to behavioral health department or private practice group for therapy advised. Medication options discussed but not prescribed. Emphasis on seeking mental health support and managing stress.    Lifestyle modification: Manage stress.    Patient Education: Importance of addressing root cause of anxiety for better sleep.    2. Elevated blood glucose levels  Elevated blood glucose levels likely due to stress and dietary habits. A1C remains in normal range.  Treatment plan: Advised to manage stress and avoid added sugars in diet.    Lifestyle modification: Manage stress and avoid added sugars in diet.    3. Blood pressure management  Blood pressure readings stable, home readings averaging 108/60 mmHg.  Treatment plan: Will discontinue hydrochlorothiazide 12.5 mg while continuing losartan. Blood pressure will be monitored to assess impact of change. Encouraged to track blood pressure regularly.    Lifestyle modification: Track blood pressure regularly.    4. Health Maintenance  Cholesterol levels stable, HDL good and LDL 84 mg/dL.  Treatment plan: Scheduled for gynecological exam, including tissue exam. Lab work reviewed, showing stable results compared to previous tests. Advised to continue current health  maintenance practices.    5. Sleep issues  Estradiol patch alleviated hot flashes. Continues to experience sleep disturbances due to work-related anxiety. Noted improvement in sleep quality with proactive measures.    Lifestyle modification: Address root cause of anxiety for better sleep.    Follow-up: Follow up in 6 months.          Instructed to follow up if symptoms worsen or fail to improve, ER/UC precautions discussed as well    Alexus Nicole MD  [facility], Family Medicine   [address] [place] [address] [address]   [place] [address]  Phone: [phone]

## 2025-05-24 NOTE — TELEPHONE ENCOUNTER
Was the patient seen in the last year in this department? Yes     Does patient have an active prescription for medications requested? Yes     Received Request Via: Patient   1 Principal Discharge DX:	Abdominal pain

## 2025-06-23 DIAGNOSIS — B00.9 HSV (HERPES SIMPLEX VIRUS) INFECTION: ICD-10-CM

## 2025-06-24 RX ORDER — ACYCLOVIR 400 MG/1
400 TABLET ORAL 2 TIMES DAILY
Qty: 180 TABLET | Refills: 3 | Status: SHIPPED | OUTPATIENT
Start: 2025-06-24

## 2025-07-18 DIAGNOSIS — I10 HYPERTENSION, UNSPECIFIED TYPE: ICD-10-CM

## 2025-07-18 RX ORDER — LOSARTAN POTASSIUM 50 MG/1
50 TABLET ORAL DAILY
Qty: 90 TABLET | Refills: 3 | Status: SHIPPED | OUTPATIENT
Start: 2025-07-18

## 2025-07-18 NOTE — TELEPHONE ENCOUNTER
Received request via: Pharmacy    Was the patient seen in the last year in this department? Yes    Does the patient have an active prescription (recently filled or refills available) for medication(s) requested? No    Pharmacy Name: [facility]     Does the patient have senior living Plus and need 100-day supply? (This applies to ALL medications) Yes, quantity updated to 100 days

## 2025-08-26 ENCOUNTER — TELEMEDICINE (OUTPATIENT)
Dept: MEDICAL GROUP | Facility: MEDICAL CENTER | Age: 70
End: 2025-08-26
Payer: MEDICARE

## 2025-08-26 VITALS
BODY MASS INDEX: 19.88 KG/M2 | SYSTOLIC BLOOD PRESSURE: 116 MMHG | HEIGHT: 62 IN | WEIGHT: 108 LBS | DIASTOLIC BLOOD PRESSURE: 72 MMHG | HEART RATE: 71 BPM

## 2025-08-26 DIAGNOSIS — I10 HYPERTENSION, UNSPECIFIED TYPE: ICD-10-CM

## 2025-08-26 DIAGNOSIS — Z13.6 SCREENING FOR ISCHEMIC HEART DISEASE: ICD-10-CM

## 2025-08-26 DIAGNOSIS — Z12.31 ENCOUNTER FOR SCREENING MAMMOGRAM FOR BREAST CANCER: ICD-10-CM

## 2025-08-26 DIAGNOSIS — R73.09 ELEVATED GLUCOSE: ICD-10-CM

## 2025-08-26 DIAGNOSIS — L71.0 PERIORAL DERMATITIS: Primary | ICD-10-CM

## 2025-08-26 DIAGNOSIS — Z13.1 SCREENING FOR DIABETES MELLITUS (DM): ICD-10-CM

## 2025-08-26 RX ORDER — LOSARTAN POTASSIUM 25 MG/1
25 TABLET ORAL DAILY
Qty: 100 TABLET | Refills: 3 | Status: SHIPPED | OUTPATIENT
Start: 2025-08-26 | End: 2026-09-30

## 2025-08-26 RX ORDER — PIMECROLIMUS 10 MG/G
CREAM TOPICAL
Qty: 1 EACH | Refills: 11 | Status: SHIPPED | OUTPATIENT
Start: 2025-08-26